# Patient Record
Sex: MALE | Race: BLACK OR AFRICAN AMERICAN | Employment: UNEMPLOYED | URBAN - METROPOLITAN AREA
[De-identification: names, ages, dates, MRNs, and addresses within clinical notes are randomized per-mention and may not be internally consistent; named-entity substitution may affect disease eponyms.]

---

## 2017-04-11 ENCOUNTER — OFFICE VISIT (OUTPATIENT)
Dept: FAMILY MEDICINE | Facility: CLINIC | Age: 47
End: 2017-04-11

## 2017-04-11 ENCOUNTER — TELEPHONE (OUTPATIENT)
Dept: FAMILY MEDICINE | Facility: CLINIC | Age: 47
End: 2017-04-11

## 2017-04-11 VITALS
BODY MASS INDEX: 39.12 KG/M2 | HEART RATE: 95 BPM | TEMPERATURE: 98.4 F | WEIGHT: 255.4 LBS | SYSTOLIC BLOOD PRESSURE: 122 MMHG | DIASTOLIC BLOOD PRESSURE: 84 MMHG

## 2017-04-11 DIAGNOSIS — Z11.3 SCREEN FOR STD (SEXUALLY TRANSMITTED DISEASE): ICD-10-CM

## 2017-04-11 DIAGNOSIS — R35.89 POLYURIA: Primary | ICD-10-CM

## 2017-04-11 DIAGNOSIS — I10 BENIGN ESSENTIAL HYPERTENSION: ICD-10-CM

## 2017-04-11 DIAGNOSIS — R10.9 CHRONIC ABDOMINAL PAIN: ICD-10-CM

## 2017-04-11 DIAGNOSIS — G89.29 CHRONIC ABDOMINAL PAIN: ICD-10-CM

## 2017-04-11 DIAGNOSIS — N48.1 BALANITIS: ICD-10-CM

## 2017-04-11 DIAGNOSIS — F33.0 MILD EPISODE OF RECURRENT MAJOR DEPRESSIVE DISORDER (H): ICD-10-CM

## 2017-04-11 DIAGNOSIS — F20.0 PARANOID SCHIZOPHRENIA (H): ICD-10-CM

## 2017-04-11 LAB
ANION GAP SERPL CALCULATED.3IONS-SCNC: 20 MMOL/L (ref 5–18)
BACTERIA: NORMAL
BILIRUBIN UR: ABNORMAL
BLOOD UR: ABNORMAL
BUN SERPL-MCNC: 9 MG/DL (ref 8–22)
CALCIUM SERPL-MCNC: 9.4 MG/DL (ref 8.5–10.5)
CASTS: NORMAL /LPF
CHLORIDE SERPL-SCNC: 94 MMOL/L (ref 98–107)
CHOLEST SERPL-MCNC: 267.8 MG/DL (ref 0–200)
CHOLEST/HDLC SERPL: 6.7 {RATIO} (ref 0–5)
CO2 SERPL-SCNC: 12 MMOL/L (ref 22–31)
CREAT SERPL-MCNC: 1.26 MG/DL (ref 0.7–1.3)
CREAT UR-MCNC: 31.1 MG/DL
CRYSTAL URINE: NORMAL /LPF
EPITHELIAL CELLS UR: <2 /LPF (ref 0–2)
GLUCOSE SERPL-MCNC: 575 MG/DL (ref 70–125)
GLUCOSE URINE: ABNORMAL
HDLC SERPL-MCNC: 40.2 MG/DL
HIV 1+2 AB+HIV1 P24 AG SERPL QL IA: NEGATIVE
KETONES UR QL: ABNORMAL
LDLC SERPL CALC-MCNC: 155 MG/DL (ref 0–129)
LEUKOCYTE ESTERASE UR: NEGATIVE
MICROALBUMIN UR-MCNC: 2.25 MG/DL (ref 0–1.99)
MICROALBUMIN/CREAT UR: 72.3 MG/G
MUCOUS URINE: NORMAL LPF
NITRITE UR QL STRIP: NEGATIVE
PH UR STRIP: 5 [PH] (ref 5–7)
POTASSIUM SERPL-SCNC: 4.2 MMOL/L (ref 3.5–5)
PROTEIN UR: NEGATIVE
RBC URINE: NORMAL /HPF
SODIUM SERPL-SCNC: 126 MMOL/L (ref 136–145)
SP GR UR STRIP: 1.01
TRIGL SERPL-MCNC: 360.7 MG/DL (ref 0–150)
UROBILINOGEN UR STRIP-ACNC: ABNORMAL
VLDL CHOLESTEROL: 72.1 MG/DL (ref 7–32)
WBC URINE: <2 /HPF

## 2017-04-11 RX ORDER — TRAZODONE HYDROCHLORIDE 100 MG/1
200 TABLET ORAL AT BEDTIME
Qty: 90 TABLET | Refills: 0 | Status: SHIPPED | OUTPATIENT
Start: 2017-04-11 | End: 2018-10-09

## 2017-04-11 RX ORDER — LISINOPRIL 40 MG/1
40 TABLET ORAL DAILY
Qty: 30 TABLET | Refills: 12 | Status: CANCELLED | OUTPATIENT
Start: 2017-04-11

## 2017-04-11 RX ORDER — LISINOPRIL 20 MG/1
20 TABLET ORAL DAILY
Qty: 30 TABLET | Refills: 11 | Status: SHIPPED | OUTPATIENT
Start: 2017-04-11 | End: 2018-10-09

## 2017-04-11 RX ORDER — METOPROLOL SUCCINATE 100 MG/1
100 TABLET, EXTENDED RELEASE ORAL DAILY
Qty: 30 TABLET | Refills: 12 | Status: CANCELLED | OUTPATIENT
Start: 2017-04-11

## 2017-04-11 RX ORDER — TRAMADOL HYDROCHLORIDE 50 MG/1
50-100 TABLET ORAL EVERY 6 HOURS PRN
Qty: 30 TABLET | Refills: 0 | Status: SHIPPED | OUTPATIENT
Start: 2017-04-11 | End: 2018-10-09

## 2017-04-11 RX ORDER — ATORVASTATIN CALCIUM 40 MG/1
40 TABLET, FILM COATED ORAL DAILY
Qty: 30 TABLET | Refills: 1 | Status: SHIPPED | OUTPATIENT
Start: 2017-04-11 | End: 2018-10-09

## 2017-04-11 RX ORDER — CLOTRIMAZOLE 1 %
CREAM (GRAM) TOPICAL 2 TIMES DAILY
Qty: 15 G | Refills: 1 | Status: SHIPPED | OUTPATIENT
Start: 2017-04-11 | End: 2018-10-09

## 2017-04-11 NOTE — MR AVS SNAPSHOT
After Visit Summary   2017    David Juarez    MRN: [id number]           Patient Information     Date Of Birth          1970        Visit Information        Provider Department      2017 1:10 PM Peyman James MD [facility]        Today's Diagnoses     Polyuria    -  1    Screen for STD (sexually transmitted disease)        Benign essential hypertension        Mild episode of recurrent major depressive disorder (H)        Paranoid schizophrenia (H)        Chronic abdominal pain        Balanitis           Follow-ups after your visit        Follow-up notes from your care team     Return in about 1 week (around 2017).      Who to contact     Please call your clinic at [phone] to:    Ask questions about your health    Make or cancel appointments    Discuss your medicines    Learn about your test results    Speak to your doctor   If you have compliments or concerns about an experience at your clinic, or if you wish to file a complaint, please contact [facility] Patient Relations at [phone] or email us at [email or web address]         Additional Information About Your Visit        MyChart Information     Enefgy is an electronic gateway that provides easy, online access to your medical records. With Enefgy, you can request a clinic appointment, read your test results, renew a prescription or communicate with your care team.     To sign up for Enefgy visit the website at [email or web address]   You will be asked to enter the access code listed below, as well as some personal information. Please follow the directions to create your username and password.     Your access code is: [email or web address]  Expires: 7/10/2017  4:46 PM     Your access code will  in 90 days. If you need help or a new code, please contact your [facility] or call [phone] for assistance.        Care EveryWhere ID      This is your Care EveryWhere ID. This could be used by other organizations to access your [place] medical records  [id number]        Your Vitals Were     Pulse Temperature BMI (Body Mass Index)             95 98.4  F (36.9  C) (Oral) 39.12 kg/m2          Blood Pressure from Last 3 Encounters:   04/11/17 122/84   10/14/14 (!) 154/104   09/11/14 (!) 155/111    Weight from Last 3 Encounters:   04/11/17 255 lb 6.4 oz (115.8 kg)   10/14/14 273 lb (123.8 kg)   09/11/14 279 lb 8 oz (126.8 kg)              We Performed the Following     Basic Metabolic Profile ([facility])     Chlamydia/Gono Amplified ([facility])     Hepatitis C Antibody ([facility])     HIV Ag/Ab Screen [place] ([facility])     Lipid Panel ([facility])     Microalbumin Random Ur ([facility])     Syphilis Screen [place] (RPR/VDRL) ([facility])     Urinalysis, Micro If ([facility])     Urine Microscopic ([facility])          Today's Medication Changes          These changes are accurate as of: 4/11/17  4:46 PM.  If you have any questions, ask your nurse or doctor.               Start taking these medicines.        Dose/Directions    clotrimazole 1 % cream   Commonly known as:  LOTRIMIN   Used for:  Balanitis   Started by:  Peyman James MD        Apply topically 2 times daily   Quantity:  15 g   Refills:  1         These medicines have changed or have updated prescriptions.        Dose/Directions    lisinopril 20 MG tablet   Commonly known as:  PRINIVIL/ZESTRIL   This may have changed:    - medication strength  - how much to take   Used for:  Benign essential hypertension   Changed by:  Peyman James MD        Dose:  20 mg   Take 1 tablet (20 mg) by mouth daily   Quantity:  30 tablet   Refills:  11       traMADol 50 MG tablet   Commonly known as:  ULTRAM   This may have changed:  additional instructions   Used for:  Chronic abdominal pain   Changed by:  Peyman James MD        Dose:   mg   Take 1-2 tablets ( mg) by mouth every 6 hours as  needed for pain maximum 6 tablet(s) per day   Quantity:  30 tablet   Refills:  0         Stop taking these medicines if you haven't already. Please contact your care team if you have questions.     ARIPiprazole 30 MG tablet   Commonly known as:  ABILIFY   Stopped by:  Peyman James MD           busPIRone 10 MG tablet   Commonly known as:  BUSPAR   Stopped by:  Peyman James MD           chlorthalidone 25 MG tablet   Commonly known as:  HYGROTON   Stopped by:  Peyman James MD           escitalopram 20 MG tablet   Commonly known as:  LEXAPRO   Stopped by:  Peyman James MD           gabapentin 800 MG tablet   Commonly known as:  NEURONTIN   Stopped by:  Peyman James MD           metoprolol 100 MG 24 hr tablet   Commonly known as:  TOPROL-XL   Stopped by:  Peyman James MD           mirtazapine 45 MG tablet   Commonly known as:  REMERON   Stopped by:  Peyman James MD           MULTIVITAL Tabs   Stopped by:  Peyman James MD           psyllium 0.52 G capsule   Commonly known as:  METAMUCIL   Stopped by:  Peyman James MD           risperiDONE 2 MG tablet   Commonly known as:  risperDAL   Stopped by:  Peyman James MD                Where to get your medicines      These medications were sent to [facility] - [place], [place] - [address], [place] [address]     Phone:  [phone]     atorvastatin 40 MG tablet    clotrimazole 1 % cream    lisinopril 20 MG tablet    traZODone 100 MG tablet         Some of these will need a paper prescription and others can be bought over the counter.  Ask your nurse if you have questions.     Bring a paper prescription for each of these medications     traMADol 50 MG tablet                Primary Care Provider Office Phone # Fax #    Peyman James [phone]       [address] [address]        Thank you!     Thank you for choosing [facility]  for your care. Our  goal is always to provide you with excellent care. Hearing back from our patients is one way we can continue to improve our services. Please take a few minutes to complete the written survey that you may receive in the mail after your visit with us. Thank you!             Your Updated Medication List - Protect others around you: Learn how to safely use, store and throw away your medicines at www.disposemymeds.org.          This list is accurate as of: 4/11/17  4:46 PM.  Always use your most recent med list.                   Brand Name Dispense Instructions for use    atorvastatin 40 MG tablet    LIPITOR    30 tablet    Take 1 tablet (40 mg) by mouth daily       clotrimazole 1 % cream    LOTRIMIN    15 g    Apply topically 2 times daily       lisinopril 20 MG tablet    PRINIVIL/ZESTRIL    30 tablet    Take 1 tablet (20 mg) by mouth daily       omeprazole 40 MG capsule    priLOSEC     Reported on 4/11/2017       traMADol 50 MG tablet    ULTRAM    30 tablet    Take 1-2 tablets ( mg) by mouth every 6 hours as needed for pain maximum 6 tablet(s) per day       traZODone 100 MG tablet    DESYREL    90 tablet    Take 2 tablets (200 mg) by mouth At Bedtime

## 2017-04-11 NOTE — LETTER
RETURN TO WORK/SCHOOL FORM    4/11/2017    Re: David Juarez  1970      To Whom It May Concern:     David Juarez was seen in clinic today.    Restrictions:  None      Peyman James MD  4/11/2017 1:43 PM
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
## 2017-04-12 ENCOUNTER — TRANSFERRED RECORDS (OUTPATIENT)
Dept: HEALTH INFORMATION MANAGEMENT | Facility: CLINIC | Age: 47
End: 2017-04-12

## 2017-04-12 ENCOUNTER — TELEPHONE (OUTPATIENT)
Dept: FAMILY MEDICINE | Facility: CLINIC | Age: 47
End: 2017-04-12

## 2017-04-12 ENCOUNTER — OFFICE VISIT (OUTPATIENT)
Dept: FAMILY MEDICINE | Facility: CLINIC | Age: 47
End: 2017-04-12

## 2017-04-12 ENCOUNTER — DOCUMENTATION ONLY (OUTPATIENT)
Dept: FAMILY MEDICINE | Facility: CLINIC | Age: 47
End: 2017-04-12

## 2017-04-12 VITALS
TEMPERATURE: 97.8 F | DIASTOLIC BLOOD PRESSURE: 86 MMHG | BODY MASS INDEX: 38.08 KG/M2 | HEART RATE: 109 BPM | SYSTOLIC BLOOD PRESSURE: 140 MMHG | WEIGHT: 248.6 LBS

## 2017-04-12 DIAGNOSIS — E11.10 TYPE 2 DIABETES MELLITUS WITH KETOACIDOSIS WITHOUT COMA, WITHOUT LONG-TERM CURRENT USE OF INSULIN (H): Primary | ICD-10-CM

## 2017-04-12 LAB
C TRACH RRNA SPEC QL NAA+PROBE: NEGATIVE
HCV AB SER QL: NEGATIVE
N GONORRHOEA RRNA SPEC QL NAA+PROBE: NEGATIVE
RPR SER QL: NORMAL

## 2017-04-12 ASSESSMENT — PATIENT HEALTH QUESTIONNAIRE - PHQ9: SUM OF ALL RESPONSES TO PHQ QUESTIONS 1-9: 11

## 2017-04-12 NOTE — TELEPHONE ENCOUNTER
See Dr. Reyes 4/12 note.   Pt states he will come tomorrow morning.   Reminded pt of the recommendations/advice from Dr. Charles. /ANA MARIA Whitfield

## 2017-04-12 NOTE — PROGRESS NOTES
Preceptor attestation:  Patient seen and discussed with the resident. Assessment and plan reviewed with resident and agreed upon.  Supervising physician: Trevon Arguelles  [facility]

## 2017-04-12 NOTE — PROGRESS NOTES
There are no exam notes on file for this visit.  Chief Complaint   Patient presents with     RECHECK     Pt. states that he got a phone call last night from our clinic stating that he should come in right away to go over lab results      Headache     Headache and alot of dizziness      Cough     Coughing, greenish and black discharge      Blood pressure 140/86, pulse 109, temperature 97.8  F (36.6  C), temperature source Oral, weight 248 lb 9.6 oz (112.8 kg).    Assessment and Plan   Continued dictation on David Juarez:     ASSESSMENT AND PLAN:     1.  New onset diabetes, presumed to be type II:  I talked extensively with patient about the diagnosis and the lab and urine results from yesterday.  Given the lab findings and today's worsening SOB, fatigue, dizziness, body aches and vomiting, I told him that I recommend that he go to the Emergency Department for further evaluation.  Patient was upset for having to wait, so he wasn't happy with this news as well.  I talked extensively about the risks of not presenting to the ER, which could include worsening SOB, abdominal pain, vomiting, altered mental status, and also death if he weren't evaluated and soon started on fluids and insulin.  The patient reiterated and verbalized understanding, but again he was still very frustrated and agitated with having to wait today for the visit, so he proceeded to leave and said he didn't want to present to the ER. He was fully decisional during our discussion  I'll attempt to contact the patient this afternoon.  We'll also talk to patient's PCP, as he has a good relationship with him.             Options for treatment and follow-up care were reviewed with the patient and/or guardian. David Juarez and/or guardian engaged in the decision making process and verbalized understanding of the options discussed and agreed with the final plan.  Patient discussed with Dr. Arguelles.   Jimbo Edwards DO         HPI        David Juarez is a 46 year old  male SUBJECTIVE:  David Juarez is a 46-year-old-male w/past medical history of major depressive disorder, hyperlipidemia, hypertension, paranoid schizophrenia, alcohol abuse, and new onset diabetes.    Patient was following up today for lab results from yesterday's visit.  He was seen in clinic and he talked about URI symptoms and also a month of polyuria and polydipsia.  He was noted to have decreased sodium, decreased total CO2, an elevated random glucose, and glucose and ketones in the urine.  Anion gap was also noted.     Patient endorsed one week of cough w/feeling of stuff getting stuck in his chest or phlegm getting stuck in his chest.  He had a little bit of runny nose as well.  He also endorsed today worsening fatigue, dizziness, and SOB.  He was also nauseous and he threw up one time so far today.  He denied recent headaches, fevers, or diarrhea.             Patient Active Problem List   Diagnosis     Health Care Home     Alcohol abuse     MDD (major depressive disorder)     Hyperlipidemia     Encounter for counseling     Benign essential hypertension     Paranoid schizophrenia (H)     Current Outpatient Prescriptions   Medication Sig Dispense Refill     atorvastatin (LIPITOR) 40 MG tablet Take 1 tablet (40 mg) by mouth daily 30 tablet 1     traZODone (DESYREL) 100 MG tablet Take 2 tablets (200 mg) by mouth At Bedtime 90 tablet 0     lisinopril (PRINIVIL/ZESTRIL) 20 MG tablet Take 1 tablet (20 mg) by mouth daily 30 tablet 11     traMADol (ULTRAM) 50 MG tablet Take 1-2 tablets ( mg) by mouth every 6 hours as needed for pain maximum 6 tablet(s) per day 30 tablet 0     clotrimazole (LOTRIMIN) 1 % cream Apply topically 2 times daily 15 g 1     omeprazole (PRILOSEC) 40 MG capsule Reported on 4/11/2017       Allergies   Allergen Reactions     Ibuprofen Sodium      Family History   Problem Relation Age of Onset     DIABETES No family hx of      Coronary Artery  Disease No family hx of      Other Cancer No family hx of      Results for orders placed or performed in visit on 04/11/17 (from the past 24 hour(s))   Hepatitis C Antibody ([facility])   Result Value Ref Range    Hepatitis C Antibody Screen Negative Negative    Narrative    Test performed by:  [facility] [address] [place] [address]   Syphilis Screen [place] (RPR/VDRL) ([facility])   Result Value Ref Range    Syphilis Screen [place] Non-Reactive Non-Reactive    Narrative    Test performed by:  [facility] [address] [place] [address]   HIV Ag/Ab Screen [place] ([facility])   Result Value Ref Range    HIV Antigen/Antibody Negative Negative    Narrative    Test performed by:  [facility] [address] [place] [address]   Lipid Panel ([facility])   Result Value Ref Range    Cholesterol 267.8 (H) 0.0 - 200.0 mg/dL    Cholesterol/HDL Ratio 6.7 (H) 0.0 - 5.0    HDL Cholesterol 40.2 >40.0 mg/dL    LDL Cholesterol Calculated 155 (H) 0 - 129 mg/dL    Triglycerides 360.7 (H) 0.0 - 150.0 mg/dL    VLDL Cholesterol 72.1 (H) 7.0 - 32.0 mg/dL   Urinalysis, Micro If ([facility])   Result Value Ref Range    Specific Gravity Urine 1.010 1.005 - 1.030    pH Urine 5.0 4.5 - 8.0    Leukocyte Esterase UR Negative NEGATIVE    Nitrite Urine Negative NEGATIVE    Protein UR Negative NEGATIVE    Glucose Urine 2+ (A) NEGATIVE    Ketones Urine 4+ (A) NEGATIVE    Urobilinogen mg/dL 0.2 E.U./dL 0.2 E.U./dL    Bilirubin UR 1+ (A) NEGATIVE    Blood UR Trace-lysed (A) NEGATIVE   Chlamydia/Gono Amplified ([facility])   Result Value Ref Range    Chlamydia trac,Amplified Prb Negative Negative    N gonorrhoeae,Amplified Prb Negative Negative    Narrative    Test performed by:  [facility] [address] [place] [address]   Microalbumin Random Ur ([facility])   Result Value Ref Range    Microalbumin, Urine 2.25 (H) 0.00 - 1.99 mg/dL    Creatinine, Urine 31.1 mg/dL    Albumin  Urine mg/g Cr 72.3 (H) <=19.9 mg/g    Narrative    Test performed by:  [facility] [address] [place] [address]  Microalbumin, Random Urine  <2.0 mg/dL . . . . . . . . Normal  3.0-30.0 mg/dL . . . . . . Microalbuminuria  >30.0 mg/dL . . . . . .  . Clinical Proteinuria  Microalbumin/Creatinine Ratio, Random Urine  <20 mg/g . . . . .. . . . Normal   mg/g . . . . . . . Microalbuminuria  >300 mg/g . . . . . . . . Clinical Proteinuria   Urine Microscopic ([facility])   Result Value Ref Range    WBC Urine <2 <5 /hpf    RBC Urine None <5 /hpf    Epithelial Cells UR <2 0 - 2 /lpf    Mucous Urine None NONE lpf    Casts Urine None NONE /lpf    Crystal Urine None NONE /lpf    Bacteria Wet Prep Few None   Basic Metabolic Profile ([facility])   Result Value Ref Range    Sodium 126 (L) 136 - 145 mmol/L    Potassium 4.2 3.5 - 5.0 mmol/L    Chloride 94 (L) 98 - 107 mmol/L    CO2, Total 12 (L) 22 - 31 mmol/L    Anion Gap 20 (H) 5 - 18 mmol/L    Glucose 575 (HH) 70 - 125 mg/dL    Calcium 9.4 8.5 - 10.5 mg/dL    Urea Nitrogen 9 8 - 22 mg/dL    Creatinine 1.26 0.70 - 1.30 mg/dL    GFR Estimate If Black >60 >60 mL/min/1.73m2    GFR Estimate >60 >60 mL/min/1.73m2    Narrative    Test performed by:  [facility] [address] [place] [address]  Fasting Glucose reference range is 70-99 mg/dL per  American Diabetes Association (ADA) guidelines.            Review of Systems:   As Above             Physical Exam:     Vitals:    04/12/17 1051   BP: 140/86   Pulse: 109   Temp: 97.8  F (36.6  C)   TempSrc: Oral   Weight: 248 lb 9.6 oz (112.8 kg)     Body mass index is 38.08 kg/(m^2).    GENERAL:: healthy, alert and mildly tachypnic at rest, agitated  EARS: No erythema around TMs bialterally  Throat: No significant tonsillar swelling or exudates noted, no anterior or posterior cervical lymphadenopathy  Heart: slightly tachycardic, regular rhythm and no murmurs  LUNGS: no rib retractions, clear throughout,  no wheezing  Abdomen: BS active, no tenderness to palpation    Office Visit on 04/11/2017   Component Date Value Ref Range Status     Specific Gravity Urine 04/11/2017 1.010  1.005 - 1.030 Final     pH Urine 04/11/2017 5.0  4.5 - 8.0 Final     Leukocyte Esterase UR 04/11/2017 Negative  NEGATIVE Final     Nitrite Urine 04/11/2017 Negative  NEGATIVE Final     Protein UR 04/11/2017 Negative  NEGATIVE Final     Glucose Urine 04/11/2017 2+* NEGATIVE Final     Ketones Urine 04/11/2017 4+* NEGATIVE Final     Urobilinogen mg/dL 04/11/2017 0.2 E.U./dL  0.2 E.U./dL Final     Bilirubin UR 04/11/2017 1+* NEGATIVE Final     Blood UR 04/11/2017 Trace-lysed* NEGATIVE Final     Chlamydia trac,Amplified Prb 04/11/2017 Negative  Negative Final     N gonorrhoeae,Amplified Prb 04/11/2017 Negative  Negative Final     Hepatitis C Antibody Screen 04/11/2017 Negative  Negative Final     Syphilis Screen [place] 04/11/2017 Non-Reactive  Non-Reactive Final     HIV Antigen/Antibody 04/11/2017 Negative  Negative Final     Cholesterol 04/11/2017 267.8* 0.0 - 200.0 mg/dL Final     Cholesterol/HDL Ratio 04/11/2017 6.7* 0.0 - 5.0 Final     HDL Cholesterol 04/11/2017 40.2  >40.0 mg/dL Final     LDL Cholesterol Calculated 04/11/2017 155* 0 - 129 mg/dL Final     Triglycerides 04/11/2017 360.7* 0.0 - 150.0 mg/dL Final     VLDL Cholesterol 04/11/2017 72.1* 7.0 - 32.0 mg/dL Final     Microalbumin, Urine 04/11/2017 2.25* 0.00 - 1.99 mg/dL Final     Creatinine, Urine 04/11/2017 31.1  mg/dL Final     Albumin Urine mg/g Cr 04/11/2017 72.3* <=19.9 mg/g Final     WBC Urine 04/11/2017 <2  <5 /hpf Final     RBC Urine 04/11/2017 None  <5 /hpf Final     Epithelial Cells UR 04/11/2017 <2  0 - 2 /lpf Final     Mucous Urine 04/11/2017 None  NONE lpf Final     Casts Urine 04/11/2017 None  NONE /lpf Final     Crystal Urine 04/11/2017 None  NONE /lpf Final     Bacteria Wet Prep 04/11/2017 Few  None Final     Sodium 04/11/2017 126* 136 - 145 mmol/L Final     Potassium  04/11/2017 4.2  3.5 - 5.0 mmol/L Final     Chloride 04/11/2017 94* 98 - 107 mmol/L Final     CO2, Total 04/11/2017 12* 22 - 31 mmol/L Final     Anion Gap 04/11/2017 20* 5 - 18 mmol/L Final     Glucose 04/11/2017 575* 70 - 125 mg/dL Final     Calcium 04/11/2017 9.4  8.5 - 10.5 mg/dL Final     Urea Nitrogen 04/11/2017 9  8 - 22 mg/dL Final     Creatinine 04/11/2017 1.26  0.70 - 1.30 mg/dL Final     GFR Estimate If Black 04/11/2017 >60  >60 mL/min/1.73m2 Final     GFR Estimate 04/11/2017 >60  >60 mL/min/1.73m2 Final

## 2017-04-12 NOTE — PROGRESS NOTES
"Spoke with patient over the phone at 9:30 AM. He tells me he is feeling well and is having no lightheadedness, dizziness, nausea or vomiting. He wonders if his glucose could have been high yesterday in clinic because he ate \"a bunch of candy\" before his labs. I told him that this is possible, but given his other abnormal labs I anticipate that his glucose has been sitting at a high value for a while. He says he doesn't check his sugars and ran out of supplies for this. He also says he is not taking his metformin because it makes his \"sugars go too low\". He says he can't come into clinic today because he has a lot of other things to get done. He says he absolutely cannot make it into clinic today and can't move around any of his plans. He does agree to call clinic this morning and schedule an appointment early tomorrow morning. I did discuss that high sugars can lead to coma and death and he assumes these risks. If he develops symptoms, he will call us or go to the ED for evaluation.     Will CC to  and nursing for FYI.    Lilibeth Charles, PGY 2  Family Medicine Resident  [facility]       "

## 2017-04-12 NOTE — TELEPHONE ENCOUNTER
----- Message from Veronique Centeno MD sent at 4/12/2017  9:29 AM CDT -----  Regarding: Follow up Labs  Lopez Ginnarosa and Petra!    Could you help follow up with this patient today and reach out to this patient to have him schedule an appointment in clinic today to follow up on his diabetes labs?     We received sign out from Dr. Ortiz, the on call resident, who talked to him last night about scheduling a follow up appointment and he said he would call clinic to do so or go to the ED if his symptoms worsened. However, we called him again this morning and are unable to reach him.    Let me know if you have any questions.   Thanks!  Veronique

## 2017-04-12 NOTE — MR AVS SNAPSHOT
After Visit Summary   2017    David Juarez    MRN: [id number]           Patient Information     Date Of Birth          1970        Visit Information        Provider Department      2017 11:00 AM Jimbo Edwards,  [facility]        Today's Diagnoses     Type 2 diabetes mellitus with ketoacidosis without coma, without long-term current use of insulin (H)    -  1       Follow-ups after your visit        Who to contact     Please call your clinic at [phone] to:    Ask questions about your health    Make or cancel appointments    Discuss your medicines    Learn about your test results    Speak to your doctor   If you have compliments or concerns about an experience at your clinic, or if you wish to file a complaint, please contact [facility] Patient Relations at [phone] or email us at [email or web address]         Additional Information About Your Visit        MyChart Information     [employer] is an electronic gateway that provides easy, online access to your medical records. With [employer], you can request a clinic appointment, read your test results, renew a prescription or communicate with your care team.     To sign up for [email or web address] visit the website at [email or web address]   You will be asked to enter the access code listed below, as well as some personal information. Please follow the directions to create your username and password.     Your access code is: [email or web address]  Expires: 7/10/2017  4:46 PM     Your access code will  in 90 days. If you need help or a new code, please contact your [facility] or call [phone] for assistance.        Care EveryWhere ID     This is your Care EveryWhere ID. This could be used by other organizations to access your [place] medical records  [id number]        Your Vitals Were     Pulse Temperature BMI (Body Mass Index)             109 97.8  F  (36.6  C) (Oral) 38.08 kg/m2          Blood Pressure from Last 3 Encounters:   04/12/17 140/86   04/11/17 122/84   10/14/14 (!) 154/104    Weight from Last 3 Encounters:   04/12/17 248 lb 9.6 oz (112.8 kg)   04/11/17 255 lb 6.4 oz (115.8 kg)   10/14/14 273 lb (123.8 kg)              Today, you had the following     No orders found for display       Primary Care Provider Office Phone # Fax #    Peyman James [phone]       [address] [address]        Thank you!     Thank you for choosing [facility]  for your care. Our goal is always to provide you with excellent care. Hearing back from our patients is one way we can continue to improve our services. Please take a few minutes to complete the written survey that you may receive in the mail after your visit with us. Thank you!             Your Updated Medication List - Protect others around you: Learn how to safely use, store and throw away your medicines at www.disposemymeds.org.          This list is accurate as of: 4/12/17 11:59 PM.  Always use your most recent med list.                   Brand Name Dispense Instructions for use    atorvastatin 40 MG tablet    LIPITOR    30 tablet    Take 1 tablet (40 mg) by mouth daily       clotrimazole 1 % cream    LOTRIMIN    15 g    Apply topically 2 times daily       lisinopril 20 MG tablet    PRINIVIL/ZESTRIL    30 tablet    Take 1 tablet (20 mg) by mouth daily       omeprazole 40 MG capsule    priLOSEC     Reported on 4/11/2017       traMADol 50 MG tablet    ULTRAM    30 tablet    Take 1-2 tablets ( mg) by mouth every 6 hours as needed for pain maximum 6 tablet(s) per day       traZODone 100 MG tablet    DESYREL    90 tablet    Take 2 tablets (200 mg) by mouth At Bedtime

## 2017-04-12 NOTE — TELEPHONE ENCOUNTER
Received a call from the [facility] lab regarding abnormal BMP results.  Patient was seen in clinic today for the first time in 2-1/2 years.  He has been having polyuria and polydipsia and was concerned about diabetes.  He was found to have a blood glucose level of 575.  His was also found to have an anion gap metabolic acidosis and ketones in his urine.    I called patient to discuss the results with him.  He continues to endorse polyuria and polydipsia but otherwise states that he feels fine.  He denies any lightheadedness or altered mental status.  I discussed the importance of him returning to clinic tomorrow morning to be reevaluated and started on a diabetic regimen.  I also discussed warning symptoms that should prompt a visit to the emergency department tonight including but not limited to lethargy, change in mentation, or rapid breathing.    Patient expressed understanding and agreement with this plan.  He will call the clinic in the morning to set up a same day appointment.    Patient discussed with Dr. Tee, attending physician, who is in agreement with the plan. Encounter routed to Dr. James.    Jim Ortiz DO PGY-2      Results for orders placed or performed in visit on 04/11/17   Urinalysis, Micro If ([facility])   Result Value Ref Range    Specific Gravity Urine 1.010 1.005 - 1.030    pH Urine 5.0 4.5 - 8.0    Leukocyte Esterase UR Negative NEGATIVE    Nitrite Urine Negative NEGATIVE    Protein UR Negative NEGATIVE    Glucose Urine 2+ (A) NEGATIVE    Ketones Urine 4+ (A) NEGATIVE    Urobilinogen mg/dL 0.2 E.U./dL 0.2 E.U./dL    Bilirubin UR 1+ (A) NEGATIVE    Blood UR Trace-lysed (A) NEGATIVE   Lipid Panel ([place])   Result Value Ref Range    Cholesterol 267.8 (H) 0.0 - 200.0 mg/dL    Cholesterol/HDL Ratio 6.7 (H) 0.0 - 5.0    HDL Cholesterol 40.2 >40.0 mg/dL    LDL Cholesterol Calculated 155 (H) 0 - 129 mg/dL    Triglycerides 360.7 (H) 0.0 - 150.0 mg/dL    VLDL Cholesterol 72.1 (H) 7.0 - 32.0  mg/dL   Urine Microscopic ([facility])   Result Value Ref Range    WBC Urine <2 <5 /hpf    RBC Urine None <5 /hpf    Epithelial Cells UR <2 0 - 2 /lpf    Mucous Urine None NONE lpf    Casts Urine None NONE /lpf    Crystal Urine None NONE /lpf    Bacteria Wet Prep Few None   Basic Metabolic Profile ([facility])   Result Value Ref Range    Sodium 126 (L) 136 - 145 mmol/L    Potassium 4.2 3.5 - 5.0 mmol/L    Chloride 94 (L) 98 - 107 mmol/L    CO2, Total 12 (L) 22 - 31 mmol/L    Anion Gap 20 (H) 5 - 18 mmol/L    Glucose 575 (HH) 70 - 125 mg/dL    Calcium 9.4 8.5 - 10.5 mg/dL    Urea Nitrogen 9 8 - 22 mg/dL    Creatinine 1.26 0.70 - 1.30 mg/dL    GFR Estimate If Black >60 >60 mL/min/1.73m2    GFR Estimate >60 >60 mL/min/1.73m2    Narrative    Test performed by:  [facility] [address] [place] [address]  Fasting Glucose reference range is 70-99 mg/dL per  American Diabetes Association (ADA) guidelines.

## 2017-04-14 ENCOUNTER — COMMUNICATION - HEALTHEAST (OUTPATIENT)
Dept: SCHEDULING | Facility: CLINIC | Age: 47
End: 2017-04-14

## 2017-04-17 NOTE — PROGRESS NOTES
SUBJECTIVE:  David Juarez is a 46-year-old male w/past medical history of hyperlipidemia, major depressive disorder, hypertension, and new onset diabetes, who presented today for f/u of lab results from yesterday.   Patient reported that he had a week of cough.  He said that it was nonproductive, but he couldn't get the phlegm up, and he felt that it was getting stuck in his throat.  Recently, he also was more SOB.  He was seen in clinic yesterday and he also reported about a month of polyuria and polydipsia.  UA and lab work were subsequently done that showed glucose and ketones in his urine and a metabolic panel with sodium of 126, CO2 of 12, anion gap of 20, and random glucose of 575.     I spoke with patient a little bit about diabetes and went over the lab work with him and answered all of his questions.  He said that the feeling of something getting stuck in his chest and SOB were the most troublesome symptoms.  He also endorsed a little bit of fatigue and dizziness today, but he denied lightheadedness.  He also endorsed nausea, and he threw up this morning.  No fevers, chills, or night sweats.

## 2017-04-23 ENCOUNTER — TRANSFERRED RECORDS (OUTPATIENT)
Dept: HEALTH INFORMATION MANAGEMENT | Facility: CLINIC | Age: 47
End: 2017-04-23

## 2017-04-24 ENCOUNTER — TRANSFERRED RECORDS (OUTPATIENT)
Dept: HEALTH INFORMATION MANAGEMENT | Facility: CLINIC | Age: 47
End: 2017-04-24

## 2017-04-25 ENCOUNTER — TRANSFERRED RECORDS (OUTPATIENT)
Dept: HEALTH INFORMATION MANAGEMENT | Facility: CLINIC | Age: 47
End: 2017-04-25

## 2017-04-26 ENCOUNTER — TELEPHONE (OUTPATIENT)
Dept: FAMILY MEDICINE | Facility: CLINIC | Age: 47
End: 2017-04-26

## 2017-07-24 ENCOUNTER — TRANSFERRED RECORDS (OUTPATIENT)
Dept: HEALTH INFORMATION MANAGEMENT | Facility: CLINIC | Age: 47
End: 2017-07-24

## 2017-07-25 ENCOUNTER — TRANSFERRED RECORDS (OUTPATIENT)
Dept: HEALTH INFORMATION MANAGEMENT | Facility: CLINIC | Age: 47
End: 2017-07-25

## 2017-08-03 ENCOUNTER — TELEPHONE (OUTPATIENT)
Dept: PSYCHOLOGY | Facility: CLINIC | Age: 47
End: 2017-08-03

## 2017-08-03 NOTE — TELEPHONE ENCOUNTER
"Placed outreach call to Mr. Juarez when he did not attend visit scheduled with Dr. James.  Visit was intended to be follow up from recent psychiatric hospitalization at [facility].  Unable to reach patient or leave message.  Outgoing message stated that he was \"unable to receive calls at this time.\"  Given chance of elevated risk in post-hospitalization period, reached out to his mother, Chanelle Juarez ([phone]) who is listed as his emergency contact in EPIC.  Unfortunately, this number was also out of service.  Able to find a different phone number for Mr. Juarez in [facility] ([phone]) and placed outreach call to this number.  This was apparently the phone of his friend, Rachna.  Did leave brief message that I was calling from [place] and that we were concerned when we did not see him today and would be very happy if he would give us a call to schedule follow up with Dr. James.  Rachna agreed to pass long this message when she next saw or talked to him.  "

## 2017-08-03 NOTE — TELEPHONE ENCOUNTER
Able to locate and print discharge plan of care from [facility] from 7/27/17.  Will have this scanned to file, but will include summary of plan below as well:    1.  Attend New Patient Group for [facility] outpatient mental health clinic on 8/14/17 from 1-3pm.  2.  Referred for mental health case management via [facility] ([phone])  3.  Referred for Rule 25, but patient declined this service during hospital stay.  4.  Was given contact info for [facility] for Adult Mental Health and Crisis numbers.

## 2017-08-05 ENCOUNTER — TRANSFERRED RECORDS (OUTPATIENT)
Dept: HEALTH INFORMATION MANAGEMENT | Facility: CLINIC | Age: 47
End: 2017-08-05

## 2017-12-19 ENCOUNTER — TRANSFERRED RECORDS (OUTPATIENT)
Dept: HEALTH INFORMATION MANAGEMENT | Facility: CLINIC | Age: 47
End: 2017-12-19

## 2017-12-20 ENCOUNTER — TRANSFERRED RECORDS (OUTPATIENT)
Dept: HEALTH INFORMATION MANAGEMENT | Facility: CLINIC | Age: 47
End: 2017-12-20

## 2018-01-09 ENCOUNTER — TRANSFERRED RECORDS (OUTPATIENT)
Dept: HEALTH INFORMATION MANAGEMENT | Facility: CLINIC | Age: 48
End: 2018-01-09

## 2018-01-16 ENCOUNTER — TRANSFERRED RECORDS (OUTPATIENT)
Dept: HEALTH INFORMATION MANAGEMENT | Facility: CLINIC | Age: 48
End: 2018-01-16

## 2018-01-17 ENCOUNTER — TRANSFERRED RECORDS (OUTPATIENT)
Dept: HEALTH INFORMATION MANAGEMENT | Facility: CLINIC | Age: 48
End: 2018-01-17

## 2018-03-07 ENCOUNTER — TRANSFERRED RECORDS (OUTPATIENT)
Dept: HEALTH INFORMATION MANAGEMENT | Facility: CLINIC | Age: 48
End: 2018-03-07

## 2018-04-29 ENCOUNTER — TRANSFERRED RECORDS (OUTPATIENT)
Dept: HEALTH INFORMATION MANAGEMENT | Facility: CLINIC | Age: 48
End: 2018-04-29

## 2018-05-03 ENCOUNTER — DOCUMENTATION ONLY (OUTPATIENT)
Dept: FAMILY MEDICINE | Facility: CLINIC | Age: 48
End: 2018-05-03
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
## 2018-05-07 NOTE — PROGRESS NOTES
I have reviewed and agree with the behavioral health fellow's documentation for this visit.  I did not personally see the patient.  Eendina Lagunas, PhD., LP

## 2018-06-26 ENCOUNTER — TRANSFERRED RECORDS (OUTPATIENT)
Dept: HEALTH INFORMATION MANAGEMENT | Facility: CLINIC | Age: 48
End: 2018-06-26

## 2018-10-09 ENCOUNTER — OFFICE VISIT (OUTPATIENT)
Dept: FAMILY MEDICINE | Facility: CLINIC | Age: 48
End: 2018-10-09
Payer: MEDICAID

## 2018-10-09 VITALS
OXYGEN SATURATION: 97 % | RESPIRATION RATE: 16 BRPM | HEART RATE: 64 BPM | DIASTOLIC BLOOD PRESSURE: 84 MMHG | SYSTOLIC BLOOD PRESSURE: 122 MMHG | WEIGHT: 239.6 LBS | BODY MASS INDEX: 36.7 KG/M2 | TEMPERATURE: 97.7 F

## 2018-10-09 DIAGNOSIS — F20.0 PARANOID SCHIZOPHRENIA (H): ICD-10-CM

## 2018-10-09 DIAGNOSIS — E11.9 TYPE 2 DIABETES MELLITUS WITHOUT COMPLICATION, WITHOUT LONG-TERM CURRENT USE OF INSULIN (H): ICD-10-CM

## 2018-10-09 DIAGNOSIS — I10 BENIGN ESSENTIAL HYPERTENSION: ICD-10-CM

## 2018-10-09 DIAGNOSIS — E78.5 HYPERLIPIDEMIA, UNSPECIFIED HYPERLIPIDEMIA TYPE: ICD-10-CM

## 2018-10-09 DIAGNOSIS — Z00.01 ENCOUNTER FOR ROUTINE ADULT MEDICAL EXAM WITH ABNORMAL FINDINGS: Primary | ICD-10-CM

## 2018-10-09 DIAGNOSIS — M62.830 BACK MUSCLE SPASM: ICD-10-CM

## 2018-10-09 LAB
BUN SERPL-MCNC: 11.7 MG/DL (ref 7–21)
CALCIUM SERPL-MCNC: 9.5 MG/DL (ref 8.5–10.1)
CHLORIDE SERPLBLD-SCNC: 96.6 MMOL/L (ref 98–110)
CHOLEST SERPL-MCNC: 230 MG/DL (ref 0–200)
CHOLEST/HDLC SERPL: 2.8 {RATIO} (ref 0–5)
CO2 SERPL-SCNC: 28.5 MMOL/L (ref 20–32)
CREAT SERPL-MCNC: 0.6 MG/DL (ref 0.7–1.3)
GFR SERPL CREATININE-BSD FRML MDRD: >90 ML/MIN/1.7 M2
GLUCOSE SERPL-MCNC: 102.5 MG'DL (ref 70–99)
HBA1C MFR BLD: 5.9 % (ref 4.1–5.7)
HDLC SERPL-MCNC: 83 MG/DL
HIV 1+2 AB+HIV1 P24 AG SERPL QL IA: NEGATIVE
LDLC SERPL CALC-MCNC: 134 MG/DL (ref 0–129)
POTASSIUM SERPL-SCNC: 4.2 MMOL/DL (ref 3.2–4.6)
SODIUM SERPL-SCNC: 130.8 MMOL/L (ref 132–142)
TRIGL SERPL-MCNC: 64.6 MG/DL (ref 0–150)
VLDL CHOLESTEROL: 12.9 MG/DL (ref 7–32)

## 2018-10-09 RX ORDER — LISINOPRIL 10 MG/1
10 TABLET ORAL DAILY
Qty: 90 TABLET | Refills: 3 | Status: SHIPPED | OUTPATIENT
Start: 2018-10-09 | End: 2019-07-12

## 2018-10-09 RX ORDER — RISPERIDONE 2 MG/1
2 TABLET ORAL AT BEDTIME
Qty: 30 TABLET | Refills: 1 | COMMUNITY
Start: 2018-10-09 | End: 2019-07-12

## 2018-10-09 RX ORDER — TRAZODONE HYDROCHLORIDE 100 MG/1
100 TABLET ORAL
Qty: 90 TABLET | Refills: 3 | Status: SHIPPED | OUTPATIENT
Start: 2018-10-09 | End: 2019-07-12

## 2018-10-09 RX ORDER — CYCLOBENZAPRINE HCL 10 MG
10 TABLET ORAL 3 TIMES DAILY PRN
Qty: 30 TABLET | Refills: 0 | Status: SHIPPED | OUTPATIENT
Start: 2018-10-09 | End: 2018-11-09

## 2018-10-09 RX ORDER — DULOXETIN HYDROCHLORIDE 60 MG/1
60 CAPSULE, DELAYED RELEASE ORAL DAILY
Qty: 90 CAPSULE | Refills: 3 | Status: SHIPPED | OUTPATIENT
Start: 2018-10-09 | End: 2019-07-12

## 2018-10-09 RX ORDER — METOPROLOL TARTRATE 25 MG/1
25 TABLET, FILM COATED ORAL 2 TIMES DAILY
Qty: 180 TABLET | Refills: 3 | Status: SHIPPED | OUTPATIENT
Start: 2018-10-09 | End: 2019-07-12

## 2018-10-09 NOTE — PATIENT INSTRUCTIONS
"  Preventive Health Recommendations  Male Ages 40 to 49    Yearly exam:             See your health care provider every year in order to  o   Review health changes.   o   Discuss preventive care.    o   Review your medicines if your doctor has prescribed any.    You should be tested each year for STDs (sexually transmitted diseases) if you re at risk.     Have a cholesterol test every 5 years.     Have a colonoscopy (test for colon cancer) if someone in your family has had colon cancer or polyps before age 50.     After age 45, have a diabetes test (fasting glucose). If you are at risk for diabetes, you should have this test every 3 years.      Talk with your health care provider about whether or not a prostate cancer screening test (PSA) is right for you.    Shots: Get a flu shot each year. Get a tetanus shot every 10 years.     Nutrition:    Eat at least 5 servings of fruits and vegetables daily.     Eat whole-grain bread, whole-wheat pasta and brown rice instead of white grains and rice.     Get adequate Calcium and Vitamin D.     Lifestyle    Exercise for at least 150 minutes a week (30 minutes a day, 5 days a week). This will help you control your weight and prevent disease.     Limit alcohol to one drink per day.     No smoking.     Wear sunscreen to prevent skin cancer.     See your dentist every six months for an exam and cleaning.   MENTAL HEALTH REFERRAL  October 9, 2018 at 10:40 am Referral forwarded to CHARAN Rodas who will process with Behavioral Health and assist with scheduling. [facility]    OPHTHALMOLOGY ADULT REFERRAL  October 9, 2018 at 10:41 am called David - \"the wireless customer you are calling is not available. Please try again later.\" [facility]  October 10, 2018 at 2:27 pm called David - \"the wireless customer you are calling is not available. Please try again later.\" [facility]  October 11, 2018 at 9:32 am tashia Hernandez - \"the wireless customer you are calling is not available. " "Please try again later.\" letter mailed. [facility]  "

## 2018-10-09 NOTE — LETTER
October 12, 2018      David Juarez  [address]    [place] [address]        Dear David,  Your bloodwork looked great.  I will see you in 3 months    Please see below for your test results.    Resulted Orders   Lipid Panel ([facility])   Result Value Ref Range    Cholesterol 230.0 (H) 0.0 - 200.0 mg/dL    Cholesterol/HDL Ratio 2.8 0.0 - 5.0    HDL Cholesterol 83.0 >40.0 mg/dL    LDL Cholesterol Calculated 134 (H) 0 - 129 mg/dL    Triglycerides 64.6 0.0 - 150.0 mg/dL    VLDL Cholesterol 12.9 7.0 - 32.0 mg/dL   Hemoglobin A1c ([facility])   Result Value Ref Range    Hemoglobin A1C 5.9 (H) 4.1 - 5.7 %   Basic Metabolic Panel ([facility])   Result Value Ref Range    Urea Nitrogen 11.7 7.0 - 21.0 mg/dL    Calcium 9.5 8.5 - 10.1 mg/dL    Chloride 96.6 (L) 98.0 - 110.0 mmol/L    Carbon Dioxide 28.5 20.0 - 32.0 mmol/L    Creatinine 0.6 (L) 0.7 - 1.3 mg/dL    Glucose 102.5 (H) 70.0 - 99.0 mg'dL    Potassium 4.2 3.2 - 4.6 mmol/dL    Sodium 130.8 (L) 132.0 - 142.0 mmol/L    GFR Estimate >90 >60.0 mL/min/1.7 m2    GFR Estimate If Black >90 >60.0 mL/min/1.7 m2   HIV Ag/Ab Screen [place] ([facility])   Result Value Ref Range    HIV Antigen/Antibody Negative Negative    Narrative    Test performed by:  [facility] [address] [place] [address]  Method is Abbott HIV Ag/Ab for the detection of HIV p24 antigen, HIV-1   antibodies and HIV-2 antibodies.       If you have any questions, please call the clinic to make an appointment.    Sincerely,    Peyman James MD

## 2018-10-09 NOTE — MR AVS SNAPSHOT
After Visit Summary   10/9/2018    David Juarez    MRN: [id number]           Patient Information     Date Of Birth          1970        Visit Information        Provider Department      10/9/2018 10:00 AM Peyman James MD [facility]        Today's Diagnoses     Encounter for routine adult medical exam with abnormal findings    -  1    Type 2 diabetes mellitus without complication, without long-term current use of insulin (H)        Benign essential hypertension        Paranoid schizophrenia (H)        Hyperlipidemia, unspecified hyperlipidemia type        Back muscle spasm          Care Instructions      Preventive Health Recommendations  Male Ages 40 to 49    Yearly exam:             See your health care provider every year in order to  o   Review health changes.   o   Discuss preventive care.    o   Review your medicines if your doctor has prescribed any.    You should be tested each year for STDs (sexually transmitted diseases) if you re at risk.     Have a cholesterol test every 5 years.     Have a colonoscopy (test for colon cancer) if someone in your family has had colon cancer or polyps before age 50.     After age 45, have a diabetes test (fasting glucose). If you are at risk for diabetes, you should have this test every 3 years.      Talk with your health care provider about whether or not a prostate cancer screening test (PSA) is right for you.    Shots: Get a flu shot each year. Get a tetanus shot every 10 years.     Nutrition:    Eat at least 5 servings of fruits and vegetables daily.     Eat whole-grain bread, whole-wheat pasta and brown rice instead of white grains and rice.     Get adequate Calcium and Vitamin D.     Lifestyle    Exercise for at least 150 minutes a week (30 minutes a day, 5 days a week). This will help you control your weight and prevent disease.     Limit alcohol to one drink per day.     No smoking.     Wear sunscreen to prevent skin cancer.     See  "your dentist every six months for an exam and cleaning.   MENTAL HEALTH REFERRAL  October 9, 2018 at 10:40 am Referral forwarded to CHARAN Rodas who will process with Behavioral Health and assist with scheduling. [facility]    OPHTHALMOLOGY ADULT REFERRAL  October 9, 2018 at 10:41 am called David - \"the wireless customer you are calling is not available. Please try again later.\" [facility]  October 10, 2018 at 2:27 pm called David - \"the wireless customer you are calling is not available. Please try again later.\" [facility]          Follow-ups after your visit        Additional Services     MENTAL HEALTH REFERRAL  -       Use this form for behavioral health consults and assessments. The referral coordinator will help to determine whether patients are best served by clinic behavioral health staff or by community providers.    Presenting Problem: depressed mood, anxiety    Currently having suicidal thoughts: No  Previous psych hospitalization: Yes    Type of referral(s) requested (indicate all that apply):  Adult Psychiatry--for long term medication management., needing a higher level of care than primary care can provide        needed:No  Language: English            OPHTHALMOLOGY ADULT REFERRAL       Your provider has referred you to: [facility]: [facility] [phone]   [email or web address]    Please be aware that coverage of these services is subject to the terms and limitations of your health insurance plan.  Call member services at your health plan with any benefit or coverage questions.      Please bring the following with you to your appointment:    (1) Any X-Rays, CTs or MRIs which have been performed.  Contact the facility where they were done to arrange for  prior to your scheduled appointment.    (2) List of current medications  (3) This referral request   (4) Any documents/labs given to you for this referral                  Who to contact     Please call " your clinic at [phone] to:    Ask questions about your health    Make or cancel appointments    Discuss your medicines    Learn about your test results    Speak to your doctor            Additional Information About Your Visit        MyChart Information     [employer] is an electronic gateway that provides easy, online access to your medical records. With [employer], you can request a clinic appointment, read your test results, renew a prescription or communicate with your care team.     To sign up for [employer] visit the website at [email or web address]   You will be asked to enter the access code listed below, as well as some personal information. Please follow the directions to create your username and password.     Your access code is: [email or web address]  Expires: 2019  8:42 AM     Your access code will  in 90 days. If you need help or a new code, please contact your [facility] or call [phone] for assistance.        Care EveryWhere ID     This is your Care EveryWhere ID. This could be used by other organizations to access your [facility] medical records  [id number]        Your Vitals Were     Pulse Temperature Respirations Pulse Oximetry BMI (Body Mass Index)       64 97.7  F (36.5  C) (Oral) 16 97% 36.7 kg/m2        Blood Pressure from Last 3 Encounters:   10/09/18 122/84   17 140/86   17 122/84    Weight from Last 3 Encounters:   10/09/18 239 lb 9.6 oz (108.7 kg)   17 248 lb 9.6 oz (112.8 kg)   17 255 lb 6.4 oz (115.8 kg)              We Performed the Following     Basic Metabolic Panel ([place])     Hemoglobin A1c ([facility])     HIV Ag/Ab Screen [place] ([facility])     Lipid Panel ([place])     MENTAL HEALTH REFERRAL  -     OPHTHALMOLOGY ADULT REFERRAL          Today's Medication Changes          These changes are accurate as of 10/9/18 11:59 PM.  If you have any questions, ask your nurse or doctor.               Start taking these medicines.         Dose/Directions    cyclobenzaprine 10 MG tablet   Commonly known as:  FLEXERIL   Used for:  Back muscle spasm   Started by:  Peyman James MD        Dose:  10 mg   Take 1 tablet (10 mg) by mouth 3 times daily as needed for muscle spasms   Quantity:  30 tablet   Refills:  0       DULoxetine 60 MG EC capsule   Commonly known as:  CYMBALTA   Used for:  Paranoid schizophrenia (H)   Started by:  Peyman James MD        Dose:  60 mg   Take 1 capsule (60 mg) by mouth daily   Quantity:  90 capsule   Refills:  3       metFORMIN 500 MG tablet   Commonly known as:  GLUCOPHAGE   Used for:  Type 2 diabetes mellitus without complication, without long-term current use of insulin (H)   Started by:  Peyman James MD        Dose:  500 mg   Take 1 tablet (500 mg) by mouth 2 times daily (with meals)   Quantity:  180 tablet   Refills:  3       metoprolol tartrate 25 MG tablet   Commonly known as:  LOPRESSOR   Used for:  Benign essential hypertension   Started by:  Peyman James MD        Dose:  25 mg   Take 1 tablet (25 mg) by mouth 2 times daily   Quantity:  180 tablet   Refills:  3         These medicines have changed or have updated prescriptions.        Dose/Directions    lisinopril 10 MG tablet   Commonly known as:  PRINIVIL/ZESTRIL   This may have changed:    - medication strength  - how much to take   Used for:  Benign essential hypertension   Changed by:  Peyman James MD        Dose:  10 mg   Take 1 tablet (10 mg) by mouth daily   Quantity:  90 tablet   Refills:  3       traZODone 100 MG tablet   Commonly known as:  DESYREL   This may have changed:    - how much to take  - when to take this  - reasons to take this   Used for:  Paranoid schizophrenia (H)   Changed by:  Peyman James MD        Dose:  100 mg   Take 1 tablet (100 mg) by mouth nightly as needed for sleep   Quantity:  90 tablet   Refills:  3         Stop taking these medicines if you haven't already. Please contact your care team if you have  questions.     atorvastatin 40 MG tablet   Commonly known as:  LIPITOR   Stopped by:  Peyman James MD           clotrimazole 1 % cream   Commonly known as:  LOTRIMIN   Stopped by:  Peyman James MD           METOPROLOL SUCCINATE ER PO   Stopped by:  Peyman James MD           omeprazole 40 MG capsule   Commonly known as:  priLOSEC   Stopped by:  Peyman James MD           traMADol 50 MG tablet   Commonly known as:  ULTRAM   Stopped by:  Peyman James MD                Where to get your medicines      These medications were sent to [facility] - [place], [place] - [address], [place] [address]     Phone:  [phone]     cyclobenzaprine 10 MG tablet    DULoxetine 60 MG EC capsule    lisinopril 10 MG tablet    metFORMIN 500 MG tablet    metoprolol tartrate 25 MG tablet    traZODone 100 MG tablet                Primary Care Provider Office Phone # Fax #    Peyman James [phone]       [address] [place] [address]        Equal Access to Services     [facility]: Hadii aad ku hadasho Soomaali, waaxda luqadaha, qaybta kaalmada adeegyada, waxay idiin hayaan moiz jung . So [facility] [phone].    ATENCIÓN: Si habla español, tiene a tavares disposición servicios gratuitos de asistencia lingüística. [facility] [phone].    We comply with applicable federal civil rights laws and [place] laws. We do not discriminate on the basis of race, color, national origin, age, disability, sex, sexual orientation, or gender identity.            Thank you!     Thank you for choosing [facility]  for your care. Our goal is always to provide you with excellent care. Hearing back from our patients is one way we can continue to improve our services. Please take a few minutes to complete the written survey that you may receive in the mail after your visit with us. Thank you!             Your Updated Medication List - Protect others around you: Learn how  to safely use, store and throw away your medicines at www.disposemymeds.org.          This list is accurate as of 10/9/18 11:59 PM.  Always use your most recent med list.                   Brand Name Dispense Instructions for use Diagnosis    cyclobenzaprine 10 MG tablet    FLEXERIL    30 tablet    Take 1 tablet (10 mg) by mouth 3 times daily as needed for muscle spasms    Back muscle spasm       DULoxetine 60 MG EC capsule    CYMBALTA    90 capsule    Take 1 capsule (60 mg) by mouth daily    Paranoid schizophrenia (H)       lisinopril 10 MG tablet    PRINIVIL/ZESTRIL    90 tablet    Take 1 tablet (10 mg) by mouth daily    Benign essential hypertension       metFORMIN 500 MG tablet    GLUCOPHAGE    180 tablet    Take 1 tablet (500 mg) by mouth 2 times daily (with meals)    Type 2 diabetes mellitus without complication, without long-term current use of insulin (H)       metoprolol tartrate 25 MG tablet    LOPRESSOR    180 tablet    Take 1 tablet (25 mg) by mouth 2 times daily    Benign essential hypertension       risperiDONE 2 MG tablet    risperDAL    30 tablet    Take 1 tablet (2 mg) by mouth At Bedtime    Paranoid schizophrenia (H)       traZODone 100 MG tablet    DESYREL    90 tablet    Take 1 tablet (100 mg) by mouth nightly as needed for sleep    Paranoid schizophrenia (H)

## 2018-10-09 NOTE — PROGRESS NOTES
Male Physical Note  Pt is a 48 yo male with a hx of Schizophrenia, MDD, HTN, HLD, ETOH abuse presenting for a complete physical exam. Pt is currently undergoing inpatient treatment for ETOH and cannabis in [place]. Pt is requesting referral to a mental health  today.     Concerns today:   - Back pain: sharp 8/10, R near shoulder blade, made worse by lifting, present at rest, first noticed months ago, hasn't tried heat/cold or any medications.       ROS:                      CONSTITUTIONAL: no fatigue, no unexpected change in weight  SKIN: no worrisome rashes, no worrisome moles, no worrisome lesions  EYES: no acute vision problems or changes  ENT: no ear problems, no mouth problems, sore throat 3-4 days, runny nose for a week  RESP: cough productive of green phlegm, no shortness of breath  CV: no chest pain, no palpitations  GI: no nausea, no vomiting, no constipation, no diarrhea  Psych: no visual hallucinations, infrequent command auditory hallucinations      No past medical history on file.     Family History   Problem Relation Age of Onset     Diabetes No family hx of      Coronary Artery Disease No family hx of      Other Cancer No family hx of     Reviewed no other significant FH           Family History and past Medical History reviewed and unchanged/updated.    Social History   Substance Use Topics     Smoking status: Current Every Day Smoker     Packs/day: 0.10     Years: 5.00     Types: Cigarettes     Smokeless tobacco: Never Used      Comment: pt is not ready to quit at this time.     Alcohol use Not on file     Single  Children ? yes - 4, live in [place]     Has anyone hurt you physically, for example by pushing, hitting, slapping or kicking you or forcing you to have sex? Denies  Do you feel threatened or controlled by a partner, ex-partner or anyone in your life? Denies    RISK BEHAVIORS AND HEALTHY HABITS:  Tobacco Use/Smoking: None and Details 1/2ppd  Illicit Drug Use: None  ETOH:  None  Do you use alcohol? No  Sexually Active: Yes, uses protection  Diet (5-7 servings of fruits/veg daily): Yes   Exercise (30 min accumulated most days): No  Dental Care: No   Calcium 1500 mg/d:  No  Seat Belt Use: Yes     Cholesterol Level (>44 yo or at risk):  Recommended and patient accepted testing.    CV Risk based on Pooled Cohort Risk:      Immunization History   Administered Date(s) Administered     Influenza (IIV3) PF 11/10/2006, 11/08/2011, 12/06/2013     TD (ADULT, 7+) 11/10/2006     TDAP Vaccine (Boostrix) 12/06/2013    Reviewed Immunization Record Today      EXAMINATION:  There were no vitals taken for this visit.  GENERAL: healthy, alert and no distress  EYES: Eyes grossly normal to inspection, no scleral jaundice, no conjunctival pallor    HENT: ear canals- normal, moderate amount of wax build up; TMs- normal;   Nose- normal, clear congestion noted; Mouth- no ulcers, no lesions  NECK: no tenderness, no adenopathy, no asymmetry, no masses, no stiffness;  RESP: lungs clear to auscultation - no rales, no rhonchi, no wheezes  CV: regular rates and rhythm, normal S1 S2, no S3 or S4 and no murmur, no click or rub -  ABDOMEN: soft, no tenderness, no  hepatosplenomegaly, no masses, normal bowel sounds  - Pt has large midline scar from a prior surgery with 2 cystic epidermoid inclusions with open caps with minimal serous drainage, no ulceration, or surrounding erythema.    MS: extremities- no gross deformities noted,   BACK: no tenderness to palpation  SKIN: no suspicious lesions, no rashes  NEURO: strength and tone- normal, sensory exam- grossly normal, normal pinprick sensation in distal LEs, mentation- intact, speech- normal  PSYCH: Alert and oriented times 3; speech- coherent , normal rate and volume; able to articulate logical thoughts, able to abstract reason, no tangential thoughts, no hallucinations or delusions, affect- normal  LYMPHATICS: ant. cervical- normal, post. cervical-  normal    ASSESSMENT:  1. Health Care Maintenance: Normal Physical Exam    PLAN:  1.Lipid panel ordered.  2. Hb A1c  3. Referrals to Opthalmology and Psych   4. For back pain - presentation consistent with muscular complaint   Pt prescribed, Flexaril 10mg Q8h PRN

## 2018-10-09 NOTE — LETTER
October 11, 2018      David Juarez  [address]    [place] [address]        Dear David,    We tried calling you regarding an appointment for OPHTHALMOLOGY referral however we were unable to reach you.    Please call us at [facility] [phone] to assist you with schedule this appointment.      Sincerely,    [facility]  Referral Department    [phone]

## 2018-10-10 ENCOUNTER — DOCUMENTATION ONLY (OUTPATIENT)
Dept: PSYCHOLOGY | Facility: CLINIC | Age: 48
End: 2018-10-10

## 2018-10-10 ENCOUNTER — TELEPHONE (OUTPATIENT)
Dept: FAMILY MEDICINE | Facility: CLINIC | Age: 48
End: 2018-10-10

## 2018-10-10 NOTE — PROGRESS NOTES
Behavioral Health Team,    Patient is being referred for mental health services by their provider Dr. James. Please advise if we are able to see patient for in house treatment or if a community option would be best.    Thank you.     Candy  Care Coordinator

## 2018-10-10 NOTE — TELEPHONE ENCOUNTER
Spoke with Hanna in the Health Office.  They needed clarification that patient is now on Lisinopril 10 mg as they still have the order for Lisinopril 20 mg.  Reviewed patient's chart and noted that the 20 mg had been discontinued yesterday and that Lisinopril 10 mg had been started.  Is requesting that the order be faxed to them.  Fax number [phone] attn: health office.  Printed off Lisinopril order from 10/9/18 and faxed to the Health Office.    Routed to Dr. James/MOODY Jaeger RN

## 2018-10-10 NOTE — TELEPHONE ENCOUNTER
[facility] phone call message- medication clarification/question:    Full Medication Name: lisinopril (PRINIVIL/ZESTRIL) 10 MG tablet       Dose:Take 1 tablet (10 mg) by mouth daily - Oral     Question: Pt came back with prescription for 10 mg and they have down 20 mg.  They have no paperwork for a med change. Would like to clarify.    Pharmacy confirmed as    [facility] - [place], [place] - [address] [id number] - [address]: Yes    OK to leave a message on voice mail? Yes    Primary language: English      needed? No    Call taken on October 10, 2018 at 9:58 AM by Patty Porter

## 2018-10-11 NOTE — PROGRESS NOTES
Social Work Note:    Data and Intervention: SW was consulted to meet with patient to facilitate a referral for a MH  and potentially other community supports.     Present for the conversation was patient, David. He invited his friend into the discussion about half way through to provide information regarding a phone number to contact David at.     David is currently living at [facility], an inpatient CD treatment facility. He is at the men's house on [facility] in [place]. He states that he will continue living their and programming for the foreseeable future. He does not know when he will complete treatment or move out. David indicates he used to have a MH . He would like to get reconnected with one. Due to living in [facility], he would have to be referred through [place]. SW gathered OMER for [facility] as well as [facility].     SW reviewed referral for Adult Mental Services in [facility], with patient. Per the website, it requires a phone call for an initial referral/intake. David consented to this SW first speaking with team at [facility] to find out of they can complete the referral or if they have already as he states he talked to them about it as well. If they have not, this SW will call [facility] and do so.     SW gathered OMER for [facility] and [facility].     Contacts for [facility] include:  Harper Cadet Stephanie RN, or Yuliet  P:     [address] [facility] [address]    Assessment and Plan:     1.) Gathered OMER's for [facility] and [facility] MH Services- will fax    2.) SW will coordinate with [facility] to find out if they entered the MH CM referral yet, if not, this SW will call [facility] to do so    Candy Cee

## 2018-10-12 NOTE — PROGRESS NOTES
Review of Dr. James's order and note indicates that this is a referral for psychiatry in the community.  Per additional social work consultation with Candy Cee, she is currently working with Mr. Juarez to re-establish mental health case management in [facility] as this is where he is currently living.  Assuming he would like to keep all his mental health services in [facility], will ask Candy to work with him to set up psychiatry at one of the following locations:    [facility]  [address]  [place] [address] [phone]    [facility]   [address] [place], [place]    [phone]    Abiel Elaine  Phone: [phone]  Address: [address] [place] [address]    [place] [facility] - psychiatry only for people who also go there for therapy  [facility] Location  [address] [place], [place]  [phone]    Let me know if you have questions or would like additional follow up from me.  Thanks!      Enedina Lagunas, Ph.D.,LP     Disclaimer  The above treatment recommendations are based on consultation with the patient's primary care provider and a review of relevant information in EPIC.? I have not personally examined the patient.? All recommendations should be implemented with considerations of the patient's relevant prior history and current clinical status.  Please contact me with any questions about the care of this patient.

## 2019-04-18 ENCOUNTER — TRANSFERRED RECORDS (OUTPATIENT)
Dept: HEALTH INFORMATION MANAGEMENT | Facility: CLINIC | Age: 49
End: 2019-04-18

## 2019-04-19 ENCOUNTER — TRANSFERRED RECORDS (OUTPATIENT)
Dept: HEALTH INFORMATION MANAGEMENT | Facility: CLINIC | Age: 49
End: 2019-04-19

## 2019-05-20 ENCOUNTER — TRANSFERRED RECORDS (OUTPATIENT)
Dept: HEALTH INFORMATION MANAGEMENT | Facility: CLINIC | Age: 49
End: 2019-05-20

## 2019-05-21 ENCOUNTER — TRANSFERRED RECORDS (OUTPATIENT)
Dept: HEALTH INFORMATION MANAGEMENT | Facility: CLINIC | Age: 49
End: 2019-05-21

## 2019-07-12 ENCOUNTER — OFFICE VISIT (OUTPATIENT)
Dept: FAMILY MEDICINE | Facility: CLINIC | Age: 49
End: 2019-07-12
Payer: MEDICAID

## 2019-07-12 VITALS
WEIGHT: 232.1 LBS | HEIGHT: 69 IN | TEMPERATURE: 97 F | RESPIRATION RATE: 20 BRPM | HEART RATE: 101 BPM | SYSTOLIC BLOOD PRESSURE: 112 MMHG | OXYGEN SATURATION: 97 % | BODY MASS INDEX: 34.38 KG/M2 | DIASTOLIC BLOOD PRESSURE: 76 MMHG

## 2019-07-12 DIAGNOSIS — F51.04 PSYCHOPHYSIOLOGICAL INSOMNIA: ICD-10-CM

## 2019-07-12 DIAGNOSIS — Z91.51 HX OF SUICIDE ATTEMPT: ICD-10-CM

## 2019-07-12 DIAGNOSIS — F10.10 ALCOHOL ABUSE: ICD-10-CM

## 2019-07-12 DIAGNOSIS — F14.20 COCAINE USE DISORDER, SEVERE, DEPENDENCE (H): ICD-10-CM

## 2019-07-12 DIAGNOSIS — F25.1 SCHIZOAFFECTIVE DISORDER, DEPRESSIVE TYPE (H): ICD-10-CM

## 2019-07-12 DIAGNOSIS — F20.0 PARANOID SCHIZOPHRENIA (H): ICD-10-CM

## 2019-07-12 DIAGNOSIS — I10 BENIGN ESSENTIAL HYPERTENSION: Primary | ICD-10-CM

## 2019-07-12 DIAGNOSIS — F12.20 SEVERE CANNABIS USE DISORDER (H): ICD-10-CM

## 2019-07-12 DIAGNOSIS — E78.5 HYPERLIPIDEMIA, UNSPECIFIED HYPERLIPIDEMIA TYPE: ICD-10-CM

## 2019-07-12 DIAGNOSIS — Z11.3 SCREEN FOR STD (SEXUALLY TRANSMITTED DISEASE): ICD-10-CM

## 2019-07-12 DIAGNOSIS — E11.9 TYPE 2 DIABETES MELLITUS WITHOUT COMPLICATION, WITHOUT LONG-TERM CURRENT USE OF INSULIN (H): ICD-10-CM

## 2019-07-12 DIAGNOSIS — Z12.5 SCREENING FOR PROSTATE CANCER: ICD-10-CM

## 2019-07-12 DIAGNOSIS — F17.200 TOBACCO USE DISORDER: ICD-10-CM

## 2019-07-12 PROBLEM — T50.902A SUICIDE ATTEMPT BY DRUG INGESTION, INITIAL ENCOUNTER (H): Status: ACTIVE | Noted: 2017-08-28

## 2019-07-12 PROBLEM — F19.90 SUBSTANCE USE DISORDER: Status: ACTIVE | Noted: 2018-04-10

## 2019-07-12 PROBLEM — F60.2 ANTISOCIAL PERSONALITY DISORDER (H): Status: ACTIVE | Noted: 2017-10-16

## 2019-07-12 PROBLEM — Z91.148 H/O MEDICATION NONCOMPLIANCE: Status: ACTIVE | Noted: 2017-04-12

## 2019-07-12 PROBLEM — F10.20 ALCOHOL USE DISORDER, MODERATE, DEPENDENCE (H): Status: ACTIVE | Noted: 2017-10-16

## 2019-07-12 PROBLEM — F15.951 AMPHETAMINE AND PSYCHOSTIMULANT-INDUCED PSYCHOSIS WITH HALLUCINATIONS (H): Status: ACTIVE | Noted: 2019-05-21

## 2019-07-12 PROBLEM — R45.851 SUICIDAL IDEATIONS: Status: ACTIVE | Noted: 2018-01-16

## 2019-07-12 PROBLEM — T50.902A SUICIDE ATTEMPT BY DRUG INGESTION, INITIAL ENCOUNTER (H): Status: RESOLVED | Noted: 2017-08-28 | Resolved: 2019-07-12

## 2019-07-12 PROBLEM — R45.851 SUICIDAL IDEATIONS: Status: RESOLVED | Noted: 2018-01-16 | Resolved: 2019-07-12

## 2019-07-12 PROBLEM — F33.2 SEVERE EPISODE OF RECURRENT MAJOR DEPRESSIVE DISORDER, WITHOUT PSYCHOTIC FEATURES (H): Status: ACTIVE | Noted: 2017-08-28

## 2019-07-12 PROBLEM — F33.3 SEVERE RECURRENT DEPRESSION WITH PSYCHOSIS (H): Status: ACTIVE | Noted: 2018-04-10

## 2019-07-12 LAB
ALBUMIN SERPL-MCNC: 3.2 G/DL (ref 3.4–5)
ALP SERPL-CCNC: 90 U/L (ref 40–150)
ALT SERPL W P-5'-P-CCNC: 37 U/L (ref 0–70)
ANION GAP SERPL CALCULATED.3IONS-SCNC: 7 MMOL/L (ref 3–14)
AST SERPL W P-5'-P-CCNC: 19 U/L (ref 0–45)
BILIRUB SERPL-MCNC: 0.3 MG/DL (ref 0.2–1.3)
BUN SERPL-MCNC: 11 MG/DL (ref 7–30)
CALCIUM SERPL-MCNC: 8.9 MG/DL (ref 8.5–10.1)
CHLORIDE SERPL-SCNC: 103 MMOL/L (ref 94–109)
CHOLEST SERPL-MCNC: 237 MG/DL
CO2 SERPL-SCNC: 28 MMOL/L (ref 20–32)
CREAT SERPL-MCNC: 0.65 MG/DL (ref 0.66–1.25)
CREAT UR-MCNC: 93 MG/DL
GFR SERPL CREATININE-BSD FRML MDRD: >90 ML/MIN/{1.73_M2}
GLUCOSE SERPL-MCNC: 173 MG/DL (ref 70–99)
HBA1C MFR BLD: 7 % (ref 0–5.6)
HBV SURFACE AG SERPL QL IA: NONREACTIVE
HCV AB SERPL QL IA: NONREACTIVE
HDLC SERPL-MCNC: 83 MG/DL
HIV 1+2 AB+HIV1 P24 AG SERPL QL IA: NONREACTIVE
LDLC SERPL CALC-MCNC: 130 MG/DL
MICROALBUMIN UR-MCNC: <5 MG/L
MICROALBUMIN/CREAT UR: NORMAL MG/G CR (ref 0–17)
NONHDLC SERPL-MCNC: 154 MG/DL
POTASSIUM SERPL-SCNC: 4.4 MMOL/L (ref 3.4–5.3)
PROT SERPL-MCNC: 7.1 G/DL (ref 6.8–8.8)
PSA SERPL-ACNC: 0.47 UG/L (ref 0–4)
SODIUM SERPL-SCNC: 138 MMOL/L (ref 133–144)
TRIGL SERPL-MCNC: 120 MG/DL
TSH SERPL DL<=0.005 MIU/L-ACNC: 0.84 MU/L (ref 0.4–4)

## 2019-07-12 PROCEDURE — 99207 C FOOT EXAM  NO CHARGE: CPT | Performed by: FAMILY MEDICINE

## 2019-07-12 PROCEDURE — 87340 HEPATITIS B SURFACE AG IA: CPT | Performed by: FAMILY MEDICINE

## 2019-07-12 PROCEDURE — 80061 LIPID PANEL: CPT | Performed by: FAMILY MEDICINE

## 2019-07-12 PROCEDURE — 83036 HEMOGLOBIN GLYCOSYLATED A1C: CPT | Performed by: FAMILY MEDICINE

## 2019-07-12 PROCEDURE — 36415 COLL VENOUS BLD VENIPUNCTURE: CPT | Performed by: FAMILY MEDICINE

## 2019-07-12 PROCEDURE — G0103 PSA SCREENING: HCPCS | Performed by: FAMILY MEDICINE

## 2019-07-12 PROCEDURE — 84443 ASSAY THYROID STIM HORMONE: CPT | Performed by: FAMILY MEDICINE

## 2019-07-12 PROCEDURE — 82043 UR ALBUMIN QUANTITATIVE: CPT | Performed by: FAMILY MEDICINE

## 2019-07-12 PROCEDURE — 86780 TREPONEMA PALLIDUM: CPT | Performed by: FAMILY MEDICINE

## 2019-07-12 PROCEDURE — 99204 OFFICE O/P NEW MOD 45 MIN: CPT | Performed by: FAMILY MEDICINE

## 2019-07-12 PROCEDURE — 80053 COMPREHEN METABOLIC PANEL: CPT | Performed by: FAMILY MEDICINE

## 2019-07-12 PROCEDURE — 87389 HIV-1 AG W/HIV-1&-2 AB AG IA: CPT | Performed by: FAMILY MEDICINE

## 2019-07-12 PROCEDURE — 86803 HEPATITIS C AB TEST: CPT | Performed by: FAMILY MEDICINE

## 2019-07-12 RX ORDER — RISPERIDONE 2 MG/1
2 TABLET ORAL 2 TIMES DAILY
Qty: 60 TABLET | Refills: 1 | Status: SHIPPED | OUTPATIENT
Start: 2019-07-12 | End: 2020-08-31

## 2019-07-12 RX ORDER — ATORVASTATIN CALCIUM 40 MG/1
40 TABLET, FILM COATED ORAL
COMMUNITY
Start: 2017-08-29 | End: 2019-07-12

## 2019-07-12 RX ORDER — TRAZODONE HYDROCHLORIDE 50 MG/1
50-100 TABLET, FILM COATED ORAL
Qty: 60 TABLET | Refills: 1 | Status: SHIPPED | OUTPATIENT
Start: 2019-07-12 | End: 2020-08-31

## 2019-07-12 RX ORDER — LISINOPRIL 2.5 MG/1
10 TABLET ORAL DAILY
Qty: 30 TABLET | Refills: 1 | Status: SHIPPED | OUTPATIENT
Start: 2019-07-12 | End: 2020-04-16

## 2019-07-12 RX ORDER — OLANZAPINE 5 MG/1
5 TABLET ORAL
COMMUNITY
Start: 2018-06-15 | End: 2019-07-12

## 2019-07-12 RX ORDER — ATORVASTATIN CALCIUM 40 MG/1
40 TABLET, FILM COATED ORAL DAILY
Qty: 30 TABLET | Refills: 1 | Status: SHIPPED | OUTPATIENT
Start: 2019-07-12 | End: 2019-10-24

## 2019-07-12 RX ORDER — BUSPIRONE HYDROCHLORIDE 10 MG/1
10 TABLET ORAL
COMMUNITY
Start: 2018-05-18 | End: 2019-07-12

## 2019-07-12 RX ORDER — DULOXETIN HYDROCHLORIDE 60 MG/1
60 CAPSULE, DELAYED RELEASE ORAL DAILY
Qty: 30 CAPSULE | Refills: 1 | Status: SHIPPED | OUTPATIENT
Start: 2019-07-12 | End: 2019-10-24

## 2019-07-12 ASSESSMENT — PAIN SCALES - GENERAL: PAINLEVEL: NO PAIN (0)

## 2019-07-12 ASSESSMENT — MIFFLIN-ST. JEOR: SCORE: 1919.76

## 2019-07-12 NOTE — LETTER
My Depression Action Plan  Name: David Juarez   Date of Birth 1970  Date: 7/12/2019    My doctor: Peyman James   My clinic: [address] [address] [phone]          GREEN    ZONE   Good Control    What it looks like:     Things are going generally well. You have normal up s and down s. You may even feel depressed from time to time, but bad moods usually last less than a day.   What you need to do:  1. Continue to care for yourself (see self care plan)  2. Check your depression survival kit and update it as needed  3. Follow your physician s recommendations including any medication.  4. Do not stop taking medication unless you consult with your physician first.           YELLOW         ZONE Getting Worse    What it looks like:     Depression is starting to interfere with your life.     It may be hard to get out of bed; you may be starting to isolate yourself from others.    Symptoms of depression are starting to last most all day and this has happened for several days.     You may have suicidal thoughts but they are not constant.   What you need to do:     1. Call your care team, your response to treatment will improve if you keep your care team informed of your progress. Yellow periods are signs an adjustment may need to be made.     2. Continue your self-care, even if you have to fake it!    3. Talk to someone in your support network    4. Open up your depression survival kit           RED    ZONE Medical Alert - Get Help    What it looks like:     Depression is seriously interfering with your life.     You may experience these or other symptoms: You can t get out of bed most days, can t work or engage in other necessary activities, you have trouble taking care of basic hygiene, or basic responsibilities, thoughts of suicide or death that will not go away, self-injurious behavior.     What you need to do:  1. Call your care team and  request a same-day appointment. If they are not available (weekends or after hours) call your local crisis line, emergency room or 911.            Depression Self Care Plan / Survival Kit    Self-Care for Depression  Here s the deal. Your body and mind are really not as separate as most people think.  What you do and think affects how you feel and how you feel influences what you do and think. This means if you do things that people who feel good do, it will help you feel better.  Sometimes this is all it takes.  There is also a place for medication and therapy depending on how severe your depression is, so be sure to consult with your medical provider and/ or Behavioral Health Consultant if your symptoms are worsening or not improving.     In order to better manage my stress, I will:    Exercise  Get some form of exercise, every day. This will help reduce pain and release endorphins, the  feel good  chemicals in your brain. This is almost as good as taking antidepressants!  This is not the same as joining a gym and then never going! (they count on that by the way ) It can be as simple as just going for a walk or doing some gardening, anything that will get you moving.      Hygiene   Maintain good hygiene (Get out of bed in the morning, Make your bed, Brush your teeth, Take a shower, and Get dressed like you were going to work, even if you are unemployed).  If your clothes don't fit try to get ones that do.    Diet  I will strive to eat foods that are good for me, drink plenty of water, and avoid excessive sugar, caffeine, alcohol, and other mood-altering substances.  Some foods that are helpful in depression are: complex carbohydrates, B vitamins, flaxseed, fish or fish oil, fresh fruits and vegetables.    Psychotherapy  I agree to participate in Individual Therapy (if recommended).    Medication  If prescribed medications, I agree to take them.  Missing doses can result in serious side effects.  I understand that  drinking alcohol, or other illicit drug use, may cause potential side effects.  I will not stop my medication abruptly without first discussing it with my provider.    Staying Connected With Others  I will stay in touch with my friends, family members, and my primary care provider/team.    Use your imagination  Be creative.  We all have a creative side; it doesn t matter if it s oil painting, sand castles, or mud pies! This will also kick up the endorphins.    Witness Beauty  (AKA stop and smell the roses) Take a look outside, even in mid-winter. Notice colors, textures. Watch the squirrels and birds.     Service to others  Be of service to others.  There is always someone else in need.  By helping others we can  get out of ourselves  and remember the really important things.  This also provides opportunities for practicing all the other parts of the program.    Humor  Laugh and be silly!  Adjust your TV habits for less news and crime-drama and more comedy.    Control your stress  Try breathing deep, massage therapy, biofeedback, and meditation. Find time to relax each day.     My support system    Clinic Contact:  Phone number:    Contact 1:  Phone number:    Contact 2:  Phone number:    Evangelical/:  Phone number:    Therapist:  Phone number:    Local crisis center:    Phone number:    Other community support:  Phone number:
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
## 2019-07-12 NOTE — LETTER
[address] [address] [phone]        July 15, 2019    David Juarez  [address]    [facility] [address]          Dear aDvid,    LAB RESULTS:     The results of your recent Tests were protein level in the urine was negative and that is normal.  Prostate enzyme was normal as well as his thyroid level.  His kidney function test, liver function test and electrolytes were normal.  His cholesterol remained to be elevated.  Please take the Lipitor as prescribed.  Recommend to recheck the cholesterol level in 6 months.  Diabetic is controlled with a hemoglobin A1c of 7.0.  It is however went up from 9.5 about 9 months ago.  Please be sure to take the metformin and continue to work on daily exercise, healthy diet and weight loss.  If you have any further questions or problems, please contact our office at [phone].        Sincerely,        Micheal Pool M.D.

## 2019-07-13 LAB — T PALLIDUM AB SER QL: NONREACTIVE

## 2019-07-17 ENCOUNTER — TELEPHONE (OUTPATIENT)
Dept: FAMILY MEDICINE | Facility: CLINIC | Age: 49
End: 2019-07-17

## 2019-07-17 NOTE — LETTER
[address] [address]  [phone]        July 17, 2019    David Juarez  [address]  [address] [place] [id number]          Dear David,    LAB RESULTS:     The results of your recent Tests were hepatitis C, syphilis, HIV, hepatitis B were normal/negative. If you have any further questions or problems, please contact our office at [phone].        Sincerely,        Micheal Pool M.D.

## 2019-07-17 NOTE — TELEPHONE ENCOUNTER
I left a message asking patient to return our call.  Please inform patient of the message below.  Emmett Baird, CMA

## 2019-07-17 NOTE — TELEPHONE ENCOUNTER
----- Message from Micheal Lira Mai, MD sent at 7/17/2019  6:42 AM CDT -----  Please let patient know that the screening test for hepatitis C, syphilis, HIV, hepatitis B were normal/negative.

## 2019-07-19 ASSESSMENT — PATIENT HEALTH QUESTIONNAIRE - PHQ9
5. POOR APPETITE OR OVEREATING: NEARLY EVERY DAY
SUM OF ALL RESPONSES TO PHQ QUESTIONS 1-9: 17

## 2019-07-19 ASSESSMENT — ANXIETY QUESTIONNAIRES
5. BEING SO RESTLESS THAT IT IS HARD TO SIT STILL: NEARLY EVERY DAY
6. BECOMING EASILY ANNOYED OR IRRITABLE: NEARLY EVERY DAY
7. FEELING AFRAID AS IF SOMETHING AWFUL MIGHT HAPPEN: NEARLY EVERY DAY
3. WORRYING TOO MUCH ABOUT DIFFERENT THINGS: NEARLY EVERY DAY
2. NOT BEING ABLE TO STOP OR CONTROL WORRYING: NEARLY EVERY DAY
1. FEELING NERVOUS, ANXIOUS, OR ON EDGE: NEARLY EVERY DAY
GAD7 TOTAL SCORE: 21
IF YOU CHECKED OFF ANY PROBLEMS ON THIS QUESTIONNAIRE, HOW DIFFICULT HAVE THESE PROBLEMS MADE IT FOR YOU TO DO YOUR WORK, TAKE CARE OF THINGS AT HOME, OR GET ALONG WITH OTHER PEOPLE: VERY DIFFICULT

## 2019-07-20 ASSESSMENT — ANXIETY QUESTIONNAIRES: GAD7 TOTAL SCORE: 21

## 2019-07-25 DIAGNOSIS — I10 BENIGN ESSENTIAL HYPERTENSION: ICD-10-CM

## 2019-07-25 RX ORDER — LISINOPRIL 10 MG/1
10 TABLET ORAL DAILY
Qty: 90 TABLET | Refills: 0 | Status: SHIPPED | OUTPATIENT
Start: 2019-07-25 | End: 2019-10-24

## 2019-07-25 NOTE — TELEPHONE ENCOUNTER
Reason for Call:  Medication or medication refill:    Do you use a [facility]?  Name of the pharmacy and phone number for the current request:  [facility] [place]     Name of the medication requested: lisinopril (PRINIVIL/ZESTRIL) 2.5 MG tabletNow requesting 10mg Xonce a day     Other request: Emma calling stating that the [facility] cannot find patient medication. Requesting to get a new script sent in for a 10mg tablet x once a day so that insurance will cover it. States patient does not have medication for tomorrow. Please advise     Can we leave a detailed message on this number? YES    Phone number patient can be reached at: Other phone number:  [phone]    Best Time:      Call taken on 7/25/2019 at 10:10 AM by Rubi Rivera

## 2019-10-08 ENCOUNTER — TELEPHONE (OUTPATIENT)
Dept: FAMILY MEDICINE | Facility: CLINIC | Age: 49
End: 2019-10-08

## 2019-10-08 NOTE — TELEPHONE ENCOUNTER
[facility] phone call message- general phone call:    Reason for call: the psychiatrist office  called  To ask for  the Dr refill the pt's lisinopril  metFORMIN when he comes in to his next appointment he will be in a half way house and to send it to [facility] in [facility] he has      Return call needed: Yes    OK to leave a message on voice mail? Yes    Primary language: English      needed? No    Call taken on October 8, 2019 at 1:22 PM by Jim Chavez    
60

## 2019-10-24 ENCOUNTER — OFFICE VISIT (OUTPATIENT)
Dept: FAMILY MEDICINE | Facility: CLINIC | Age: 49
End: 2019-10-24
Payer: MEDICAID

## 2019-10-24 VITALS
SYSTOLIC BLOOD PRESSURE: 134 MMHG | TEMPERATURE: 97 F | WEIGHT: 253 LBS | RESPIRATION RATE: 16 BRPM | BODY MASS INDEX: 37.25 KG/M2 | OXYGEN SATURATION: 97 % | DIASTOLIC BLOOD PRESSURE: 85 MMHG | HEART RATE: 90 BPM

## 2019-10-24 DIAGNOSIS — Z00.00 ROUTINE GENERAL MEDICAL EXAMINATION AT A HEALTH CARE FACILITY: ICD-10-CM

## 2019-10-24 DIAGNOSIS — E78.5 HYPERLIPIDEMIA, UNSPECIFIED HYPERLIPIDEMIA TYPE: ICD-10-CM

## 2019-10-24 DIAGNOSIS — Z11.3 SCREEN FOR STD (SEXUALLY TRANSMITTED DISEASE): ICD-10-CM

## 2019-10-24 DIAGNOSIS — J30.2 SEASONAL ALLERGIC RHINITIS, UNSPECIFIED TRIGGER: Primary | ICD-10-CM

## 2019-10-24 RX ORDER — ATORVASTATIN CALCIUM 40 MG/1
40 TABLET, FILM COATED ORAL DAILY
Qty: 30 TABLET | Refills: 11 | Status: SHIPPED | OUTPATIENT
Start: 2019-10-24 | End: 2020-04-16

## 2019-10-24 RX ORDER — DIPHENHYDRAMINE HCL 25 MG
25 CAPSULE ORAL EVERY 6 HOURS PRN
Qty: 30 CAPSULE | Refills: 1 | Status: SHIPPED | OUTPATIENT
Start: 2019-10-24 | End: 2022-01-01

## 2019-10-24 NOTE — PROGRESS NOTES
Male Physical Note      Concerns today: In CD inpatient treatment, having allergy symptoms, having chronic back pain        ROS:                      CONSTITUTIONAL: no fatigue, no unexpected change in weight  SKIN: no worrisome rashes, no worrisome moles, no worrisome lesions  EYES: no acute vision problems or changes  ENT: no ear problems, no mouth problems, no throat problems  RESP: no significant cough, no shortness of breath  CV: no chest pain, no palpitations, no new or worsening peripheral edema  GI: no nausea, no vomiting, no constipation, no diarrhea  MUSCULOSKELETAL:history of mechanical low back pain, was on tramadol with good relief of symptoms and would like to resume this  NEURO: no weakness, no dizziness, no syncope, no headaches  ENDOCRINE: h/o diabetes, well controlled, A1C was 7.0 in July  PSYCHIATRIC: H/O schizophrenia, treated by psychiatry, stable at this time    Past Medical History:   Diagnosis Date     Depressive disorder      Diabetes (H)      Hyperlipidemia      Hypertension      Schizophrenia (H)      Suicide attempt by drug ingestion, initial encounter (H) 8/28/2017        Family History   Problem Relation Age of Onset     No Known Problems Mother      Hypertension Father      Unknown/Adopted Maternal Grandmother      Unknown/Adopted Maternal Grandfather      Unknown/Adopted Paternal Grandmother      Unknown/Adopted Paternal Grandfather      No Known Problems Son      Diabetes No family hx of      Coronary Artery Disease No family hx of      Other Cancer No family hx of      Reviewed no other significant FH           Family History and past Medical History reviewed and unchanged/updated.    Social History     Tobacco Use     Smoking status: Current Every Day Smoker     Packs/day: 0.10     Years: 5.00     Pack years: 0.50     Types: Cigarettes     Smokeless tobacco: Never Used     Tobacco comment: pt is not ready to quit at this time.   Substance Use Topics     Alcohol use: Yes      Comment: heavy daily drinker for years, last drink 20     Single  Children ? no    Has anyone hurt you physically, for example by pushing, hitting, slapping or kicking you or forcing you to have sex? Denies  Do you feel threatened or controlled by a partner, ex-partner or anyone in your life? Denies    RISK BEHAVIORS AND HEALTHY HABITS:  Tobacco Use/Smokin cigarettes a day  Illicit Drug Use: None  ETOH: None  Do you use alcohol? No  Sexually Active: No  Diet (5-7 servings of fruits/veg daily): Yes   Exercise (30 min accumulated most days):No  Dental Care: Yes   Calcium 1500 mg/d:  Yes  Seat Belt Use: Yes     Cholesterol Level (>44 yo or at risk):  Date done 2019  Result(s) normal, ASA use (>3% risk in 5 y):  Date done 2019  Result(s) yes and HIV screening:  Recommended and patient declined testing.        CV Risk based on Pooled Cohort Risk:pending       Immunization History   Administered Date(s) Administered     DTaP, Unspecified 2013     Flu, Unspecified 11/10/2006, 2011, 2013     Influenza (IIV3) PF 11/10/2006, 2011, 2013     Mantoux Tuberculin Skin Test 2017, 2018, 2018, 2018     Pneumococcal 23 valent 2016     TD (ADULT, 7+) 11/10/2006, 2017     TDAP Vaccine (Boostrix) 2013     Td (Adult), Adsorbed 11/10/2006     Reviewed Immunization Record Today    EXAMINATION:  /85   Pulse 90   Temp 97  F (36.1  C) (Oral)   Resp 16   Wt 114.8 kg (253 lb)   SpO2 97%   BMI 37.25 kg/m    GENERAL: healthy, alert and no distress  EYES: Eyes grossly normal to inspection, extraocular movements - intact, and PERRL  HENT: ear canals- normal; TMs- normal; Nose- normal; Mouth- no ulcers, no lesions  NECK: no tenderness, no adenopathy, no asymmetry, no masses, no stiffness; thyroid- normal to palpation  RESP: lungs clear to auscultation - no rales, no rhonchi, no wheezes  CV: regular rates and rhythm, normal S1 S2, no S3 or S4 and no  murmur, no click or rub -  ABDOMEN: soft, no tenderness, no  hepatosplenomegaly, no masses, normal bowel sounds. Large midline scar  MS: extremities- no gross deformities noted, no edema, mild venous varicosities in lower extremities  SKIN: no suspicious lesions, no rashes  NEURO: strength and tone- normal, sensory exam- grossly normal, mentation- intact, speech- normal, reflexes- symmetric  BACK: no CVA tenderness, paralumbar tenderness in the lumbar area, poor rom with flexion and extension  PSYCH: Alert and oriented times 3; speech- coherent , normal rate and volume; able to articulate logical thoughts, able to abstract reason, no tangential thoughts, no hallucinations or delusions, affect- normal  LYMPHATICS: ant. cervical- normal, post. cervical- normal, axillary- normal, supraclavicular- normal, inguinal- normal    ASSESSMENT:  1. DM-2 stable  2. HTN - stable  3. Polysubstance abuse - currently in CD treatment  4. Seasonal allergies  5. Mechanical low back pain  6. Schizophrenia - stable    PLAN:  1.continue current cares  2. F/u for CPM initiation  3. Continue CD treatment  4. Continue to follow with Psych  5. Refill meds  6. Start benadryl prn

## 2019-10-25 LAB
C TRACH RRNA SPEC QL NAA+PROBE: NEGATIVE
N GONORRHOEA RRNA SPEC QL NAA+PROBE: NEGATIVE

## 2019-10-31 ENCOUNTER — OFFICE VISIT (OUTPATIENT)
Dept: FAMILY MEDICINE | Facility: CLINIC | Age: 49
End: 2019-10-31
Payer: MEDICAID

## 2019-10-31 VITALS
BODY MASS INDEX: 38.43 KG/M2 | DIASTOLIC BLOOD PRESSURE: 83 MMHG | OXYGEN SATURATION: 97 % | TEMPERATURE: 98.4 F | SYSTOLIC BLOOD PRESSURE: 128 MMHG | RESPIRATION RATE: 16 BRPM | WEIGHT: 261 LBS | HEART RATE: 91 BPM

## 2019-10-31 DIAGNOSIS — M54.59 MECHANICAL LOW BACK PAIN: Primary | ICD-10-CM

## 2019-10-31 DIAGNOSIS — F12.20 SEVERE CANNABIS USE DISORDER (H): Primary | ICD-10-CM

## 2019-10-31 LAB
AMPHETAMINES QUAL: NEGATIVE
BARBITURATES QUAL URINE: NEGATIVE
BENZODIAZEPINE QUAL URINE: NEGATIVE
BUPRENORPHINE QUAL URINE: NEGATIVE
CANNABINOIDS UR QL SCN: NEGATIVE
COCAINE QUAL URINE: NEGATIVE
METHAMPHETAMINE: NEGATIVE
METHODONE QUAL: NEGATIVE
MORPHINE QUAL: NEGATIVE
OXYCODONE QUAL: NEGATIVE
PHENCYCLIDINE: NEGATIVE
PROPOXYPHENE: NEGATIVE
TEMPERATURE OF URINE WAS BETWEEN 90-100 DEGREES F: YES
TRICYCLIC ANTIDEPRESSANTS: NEGATIVE

## 2019-10-31 RX ORDER — TIZANIDINE HYDROCHLORIDE 4 MG/1
4 CAPSULE, GELATIN COATED ORAL 3 TIMES DAILY
Qty: 30 CAPSULE | Refills: 1 | Status: SHIPPED | OUTPATIENT
Start: 2019-10-31 | End: 2019-11-01

## 2019-10-31 NOTE — PATIENT INSTRUCTIONS
10/31/19   PAIN MANAGEMENT REFERRAL  New patient packet needs to be completed and returned to [facility] prior to scheduling new patient appointment.    ** WAITING FOR PATIENT TO FILL OUT PAPERWORK AND  WILL FAX BACK TO ME.     [facility]  [address]   [place] [address]  Phone: [phone]  Fax: [phone]    Aimee Horta

## 2019-10-31 NOTE — PROGRESS NOTES
Chronic Pain Initial Visit         David Juarez is a 48 year old year old who is  here for evaluation and treatment of chronic pain.     David is here for evaluation and to develop a multifaceted chronic pain plan that may or may not include chronic opiate therapy.        Previously been on a pain contract? No   Previous pain diagnosis: YES back pain    Pain location: back pain  Pain onset: 4 years ago  Pain is described as Aching and Throbbing   Pain rating: intensity ranges from 5/10 to 10/10, and Averages 6/10 on a 0-10 scale.  Aggravating factors include: moving around  Relieving factors include: medications, Details:meds    Who lives in your household? alone  Recent family or social stressors:  none noted  Are you currently working ? No  What do you or did you do? [employer]    Sleep:    What is the quality of your sleep? Good   How many hours do you need? 6 How many do you get? 7    Mental Health  Diagnosis of Depression :  No   Other MH Diagnosis?:   YES paranoid Schizophrenia  History of preadolescence sexual abuse?:No      Substance Use   Alcohol Use:Never used / No history  Tobacco Use: 1/2 ppd  History of chemical dependency:   YES , currently in inpatient treatment  Current use of recreational substances cannabis  and cocaine   Any substance abuse in household? No           Family history:Substance use  Family History of alcohol abuse? No   Family History of Illicit substance use? No   Family History of prescription medication  abuse? No     Past pain Treatments   Pain Clinic:  No   Physical Therapy:  No  Psychologist:  Yes see them next month  Relaxation techniques/biofeedback:  Yes hot bath  Chiropractor:  No  Acupuncture:  No  TENs Unit:  No  Injections:  No   Current Exercise: Activity little bit,  2-3 days/week for an average of 45-60 minutes    Previous medication treatments:  Opiates - codeine (Tylenol #3) and tramadol IR  Antiepileptics - none  Antidepressants - risperidone  (Risperdal) and trazadone   NSAIDs - indomethacin (Indocin)  Muscle relaxants - cyclobenzaprine (Flexeril)  Topicals - none    LEGAL ISSUES  Are you currently involved in litigation related to your pain complaint? No  Have you ever been arrested or had other legal problems? Yes: marijuana  Have you filed a Workers' Compensation/SSI/MVA claim related to your pain complaint? No    --OMER/CareEverywhere signed for other providers,  Yes  --[place] Prescriber s Database reviewed:Yes    What are you hoping to do when your pain is more controlled? Exercise more, look for work ()           Opioid Evaluation tools     DIRE Score: Patient Selection for Opioid Analgesia      Diagnosis: 1    1 = Benign chronic condition with minimal objective findings or no definite medical diagnosis.  Examples: fibromyalgia, migraine headaches, non-specific back pain.  2 = Slowly progressive condition concordant with moderate pain, or fixed condition with  moderate objective findings. Examples: failed back surgery syndrome, back pain with  moderate degenerative changes, neuropathic pain.  3 = Advanced condition concordant with severe pain with objective findings. Examples:  severe ischemic vascular disease, advanced neuropathy, severe spinal stenosis.    Intractability: 1    1 = Few therapies have been tried and the patient takes a passive role in his/her pain  management process.  2 = Most customary treatments have been tried but the patient is not fully engaged in the pain  management process, or barriers prevent (insurance, transportation, medical illness).  3 = Patient fully engaged in a spectrum of appropriate treatments but with inadequate  response.    Risk (P+C+R+S): 8    Psychological: 1    1 = Serious personality dysfunction or mental illness interfering with care. Example: personality disorder, severe affective disorder, significant personality issues.  2 = Personality or mental health interferes moderately.  Example: depression or anxiety disorder.  3 = Good communication with clinic. No significant personality dysfunction or mental illness.    Chemical Health: 2    1 = Active or very recent use of illicit drugs, excessive alcohol, or prescription drug abuse.  2 = Chemical coper (uses medications to cope with stress) or history of CD in remission.  3 = No CD history. Not drug-focused or chemically reliant.    Reliability: 3  1 = History of numerous problems: medication misuse, missed appointments, rarely follows through.  2 = Occasional difficulties with compliance, but generally reliable.  3 = Highly reliable patient with meds, appointments & treatment.    Social Support: 2  1 = Life in chaos. Little family support and few close relationships. Loss of most normal life roles.  2 = Reduction in some relationships and life roles.  3 = Supportive family/close relationships. Involved in work or school and no social isolation.     Efficacy score: 2    1 = Poor function or minimal pain relief despite moderate to high doses.  2 = Moderate benefit with function improved in a number of ways (or insufficient info - hasn't tried opioid yet or very low doses or too short of a trial).  3 = Good improvement in pain and function and quality of life with stable doses over time.      Total score (D + I + R + E): 12    Score 7-13: Not a suitable candidate for long-term opioid analgesia  Score 14-21: May be a good candidate for long-term opioid analgesia  Used with permission by Juaquin Santiago M.D.  Date: Oct 31, 2019  Patient Name: David Juarez    OPIOID RISK TOOL        Helio each box that applies Item score if female Item score if male   1. Family history of substance abuse Alcohol  1 3    Illegal Drugs  2 3    Prescription Drugs  4 4   2. Personal History of Substance Abuse Alcohol  3 3    Illegal Drugs 3 4 4    Prescription Drugs  5 5   3. Age (Helio box if 16 - 45)   1 1   4. History of Preadolescent Sexual Abuse   3 0   5.  Psychological Disease ADD,  OCD,  Bipolar,  Schizophrenia 2 2 2    Depression 1 1 1     TOTAL:  6     Used by Permission: Laura Ramirez MD   DIRE Score:  No flowsheet data found.   Score 7-13: Not a suitable candidate for long-term opioid analgesia    Source: [facility]   2005    Opioid Risk Tool Score:  No flowsheet data found.   Total Score Risk Category  4 - 7 =  Moderate Risk   of future problems with Opioid     Functional Assessment  Questionnaire -5  No flowsheet data found.    Bedside opioid-overdose calculator (RIOSORD)      In the past six months, has your patient had an outpatient,          Points for  inpatient or ED visit for any of the following:           'yes' answer    Substance use disorder (addiction) of any kind,     25   including alcohol and cannabis?  Bipolar disorder or schizophrenia?       10  Stroke or other cerebrovascular disease?          9  Signifi cant chronic kidney disease?         8  Heart failure?           7  Nonmalignant pancreatic disease?         7  Chronic pulmonary disease?          5  Chronic headache?           5  Does your patient take:  Fentanyl (transdermal or transmucosal)?      13  Morphine?          11  Methadone?          10  Hydromorphone?           7  Extended-release or long-acting (ER/LA) opioid?          5  Benzodiazepine?           9  Antidepressant?           8   >100 mg MME/day?                      7  TOTAL   43    Average probability of overdose or serious opioid-induced respiratory depression in the next six months   Points    Risk   0-4     2%   5-7     5%   8-9     7%   10-17     15%   18-25     30%   26-41     55%   42     83%        Problem, Medication and Allergy Lists were reviewed and are current..    Patient is an established patient of this clinic..         Review of Systems:   CONSTITUTIONAL: no fatigue, no unexpected change in weight  SKIN: no worrisome rashes, no worrisome moles, no worrisome  lesions  EYES: no acute vision problems or changes  ENT: no ear problems, no mouth problems, no throat problems  RESP: no significant cough, no shortness of breath  CV: no chest pain, no palpitations, no new or worsening peripheral edema  GI: no nausea, no vomiting, no constipation, no diarrhea         Physical Exam:   There were no vitals filed for this visit.  There is no height or weight on file to calculate BMI.  Data Unavailable  Vitals were reviewed and were normal  Vitals were reviewed  GENERAL: healthy, alert, well nourished, well hydrated, no distress  HENT: ear canals- normal; TMs- normal; Nose- normal; Mouth- no ulcers, no lesions  NECK: no tenderness, no adenopathy, no asymmetry, no masses, no stiffness; thyroid- normal to palpation  RESP: lungs clear to auscultation - no rales, no rhonchi, no wheezes  CV: regular rates and rhythm, normal S1 S2, no S3 or S4 and no murmur, no click or rub -  ABDOMEN: Large midline scar,soft, no tenderness, no  hepatosplenomegaly, no masses, normal bowel sounds  MS: extremities- no gross deformities noted, no edema  NEURO: strength and tone- normal, sensory exam- grossly normal, mentation- intact, speech- normal, reflexes- symmetric  BACK: no CVA tenderness, no paralumbar tenderness, some paraspinous tenderness in the low thoracic area, rom restricted with flexion and extension.  Neurovascular status intact and symmetric in the lower extremities  PSYCH: Alert and oriented times 3; speech- coherent , normal rate and volume; able to articulate logical thoughts, able to abstract reason, no tangential thoughts, no hallucinations or delusions, affect- normal        Results:     Results for orders placed or performed in visit on 10/24/19   Chlamydia/Gono Amplified (DNA SEQ)   Result Value Ref Range    Chlamydia trac,Amplified Prb Negative Negative    N gonorrhoeae,Amplified Prb Negative Negative    Narrative    Test performed by:  [facility] [address] [place] [address]        -Comprehensive rapid urine drug screen obtained today: Yes    Assessment and Plan    David Juarez is here for evaluation of chronic pain.      ORT and DIRE reflect the need to see a pain specialist         The etiology of patient's chronic pain is mechanical low back pain   1. Mechanical low back pain  Continue tylenol and stretching  - PAIN MANAGEMENT REFERRAL  - tiZANidine (ZANAFLEX) 4 MG capsule; Take 1 capsule (4 mg) by mouth 3 times daily  Dispense: 30 capsule; Refill: 1  - order for DME; Equipment being ordered: heating pad  Dispense: 1 each; Refill: 1    Exercise discussed and patient  and declined to increase exercise at this time    I explained that the assessment process may take up to 3 visits.   Expectations for CPM were also copied into the patient instructions.    Goals of therapy:     Increase function     Decrease suffering     May not relieve pain  1) Non-medical management: stretching   2) Non-opioid, medical management: tylenol, tizanidine  3) Opioid medical management: defer to pain specialist  4) Behavioral Health referral completed for CPM BH evaluation.      6) Asked patient to follow-up in one month with same provider for   CPM Follow up (20min)visit after he's seen the pain clinic  7)Chronic pain syndrome added to the patient s problem list               Total of 30 minutes was spent in face to face contact with patient with > 50% in counseling and coordination of care.  Options for treatment and/or follow-up care were reviewed with the patient. David Juarez was engaged and actively involved in the decision making process. He verbalized understanding of the options discussed and was satisfied with the final plan.    Peyman James MD

## 2019-11-11 ENCOUNTER — TELEPHONE (OUTPATIENT)
Dept: FAMILY MEDICINE | Facility: CLINIC | Age: 49
End: 2019-11-11

## 2019-11-11 NOTE — LETTER
[address] [address]  [phone]        November 11, 2019    David Juarez  [address]  [facility] [address]          Dear David,    We were looking through you chart and noticed that you haven't been seen in a while, we would like to schedule an office visit for a Diabetes recheck when you have time. Please give the clinic a call at [phone] and schedule at your earliest convienence.  Thank you so much, and we look forward to seeing you soon.       I have sent with a PHQ-9 (depression screening questionnaire), if you could please fill it out and send back in the pre-paid envelope we have provided we would greatly appreciate it.  The questions in the PHQ-9 can sometimes be related to pain but please try to only answer the questions for depression and anxiety related items.  Thank you so much for your time we appreciate it and look forward to seeing you soon!        Sincerely,        Office of Dr. Pool

## 2019-11-11 NOTE — TELEPHONE ENCOUNTER
Panel Management Review      Patient has the following on his problem list:     Diabetes    ASA: Passed    Last A1C  Lab Results   Component Value Date    A1C 7.0 07/12/2019    A1C 5.9 10/09/2018    A1C 5.9 12/24/2012     A1C tested: FAILED    Last LDL:    Lab Results   Component Value Date    CHOL 237 07/12/2019     Lab Results   Component Value Date    HDL 83 07/12/2019     Lab Results   Component Value Date     07/12/2019     Lab Results   Component Value Date    TRIG 120 07/12/2019     Lab Results   Component Value Date    CHOLHDLRATIO 2.8 10/09/2018     Lab Results   Component Value Date    NHDL 154 07/12/2019       Is the patient on a Statin? YES             Is the patient on Aspirin? NO    Medications     HMG CoA Reductase Inhibitors     atorvastatin (LIPITOR) 40 MG tablet             Last three blood pressure readings:  BP Readings from Last 3 Encounters:   10/31/19 128/83   10/24/19 134/85   07/12/19 112/76       Date of last diabetes office visit: 9/19/2019     Tobacco History:     History   Smoking Status     Current Every Day Smoker     Packs/day: 0.10     Years: 5.00     Types: Cigarettes   Smokeless Tobacco     Never Used     Comment: pt is not ready to quit at this time.         Hypertension   Last three blood pressure readings:  BP Readings from Last 3 Encounters:   10/31/19 128/83   10/24/19 134/85   07/12/19 112/76     Blood pressure: Passed    HTN Guidelines:  Less than 140/90      Composite cancer screening  Chart review shows that this patient is due/due soon for the following None  Summary:    Patient is due/failing the following:   PHQ9    Action needed:   Patient needs office visit for diabetes recheck.    Type of outreach:    Sent letter.    Questions for provider review:    None                                                                                                                                    Aimee Best CMA      Chart routed to Care Team .

## 2020-01-24 ENCOUNTER — HOSPITAL (OUTPATIENT)
Dept: OTHER | Age: 50
End: 2020-01-24
Attending: EMERGENCY MEDICINE

## 2020-01-24 LAB
ANALYZER ANC (IANC): ABNORMAL
ANION GAP SERPL CALC-SCNC: 15 MMOL/L (ref 10–20)
BASOPHILS # BLD: 0.1 K/MCL (ref 0–0.3)
BASOPHILS NFR BLD: 1 %
BUN SERPL-MCNC: 11 MG/DL (ref 6–20)
BUN/CREAT SERPL: 17 (ref 7–25)
CALCIUM SERPL-MCNC: 8.9 MG/DL (ref 8.4–10.2)
CHLORIDE SERPL-SCNC: 100 MMOL/L (ref 98–107)
CO2 SERPL-SCNC: 25 MMOL/L (ref 21–32)
CREAT SERPL-MCNC: 0.65 MG/DL (ref 0.67–1.17)
DIFFERENTIAL METHOD BLD: ABNORMAL
EOSINOPHIL # BLD: 0.3 K/MCL (ref 0.1–0.5)
EOSINOPHIL NFR BLD: 3 %
ERYTHROCYTE [DISTWIDTH] IN BLOOD: 15.7 % (ref 11–15)
GLUCOSE SERPL-MCNC: 91 MG/DL (ref 65–99)
HCT VFR BLD CALC: 43 % (ref 39–51)
HGB BLD-MCNC: 14.6 G/DL (ref 13–17)
IMM GRANULOCYTES # BLD AUTO: 0 K/MCL (ref 0–0.2)
IMM GRANULOCYTES NFR BLD: 0 %
LYMPHOCYTES # BLD: 2.4 K/MCL (ref 1–4.8)
LYMPHOCYTES NFR BLD: 30 %
MCH RBC QN AUTO: 26.4 PG (ref 26–34)
MCHC RBC AUTO-ENTMCNC: 34 G/DL (ref 32–36.5)
MCV RBC AUTO: 77.9 FL (ref 78–100)
MONOCYTES # BLD: 0.8 K/MCL (ref 0.3–0.9)
MONOCYTES NFR BLD: 10 %
NEUTROPHILS # BLD: 4.3 K/MCL (ref 1.8–7.7)
NEUTROPHILS NFR BLD: 56 %
NEUTS SEG NFR BLD: ABNORMAL %
NRBC (NRBCRE): 0 /100 WBC
PLATELET # BLD: 277 K/MCL (ref 140–450)
POTASSIUM SERPL-SCNC: 4.1 MMOL/L (ref 3.4–5.1)
POTASSIUM SERPL-SCNC: ABNORMAL MMOL/L
RBC # BLD: 5.52 MIL/MCL (ref 4.5–5.9)
SODIUM SERPL-SCNC: 136 MMOL/L (ref 135–145)
WBC # BLD: 7.9 K/MCL (ref 4.2–11)

## 2020-01-24 PROCEDURE — 99283 EMERGENCY DEPT VISIT LOW MDM: CPT | Performed by: NURSE PRACTITIONER

## 2020-03-11 ENCOUNTER — TELEPHONE (OUTPATIENT)
Dept: FAMILY MEDICINE | Facility: CLINIC | Age: 50
End: 2020-03-11

## 2020-03-11 NOTE — TELEPHONE ENCOUNTER
Patient is due for a PHQ-9.  Index start date:11/20/2019  Index end date:3/19/2020    Please call patient.

## 2020-03-16 ENCOUNTER — TELEPHONE (OUTPATIENT)
Dept: FAMILY MEDICINE | Facility: CLINIC | Age: 50
End: 2020-03-16

## 2020-03-16 NOTE — TELEPHONE ENCOUNTER
[facility] phone call message- patient requesting a refill:    Full Medication Name:     lisinopril (PRINIVIL/ZESTRIL) 2.5 MG tablet lisinopril (PRINIVIL/ZESTRIL) 2.5 MG tablet   Take 4 tablets (10 mg) by mouth daily - Oral     metFORMIN (GLUCOPHAGE) 500 MG tablet   Take 1 tablet (500 mg) by mouth 2 times daily (with meals) - Oral     Pharmacy confirmed as   [facility] [place] [facility] [place], [address]  [address] [place] [address]  Phone: [phone] Fax: [phone]  : Yes    Additional Comments:    Amanda states Pt has been seeing Dr. James for years.     OK to leave a message on voice mail? Yes    Primary language: English      needed? No    Call taken on March 16, 2020 at 2:34 PM by Ashanti Guillory CMA

## 2020-03-16 NOTE — TELEPHONE ENCOUNTER
Patient no longer is at the [facility] and no other number listed.     Kaykay Bella MA 3/16/2020

## 2020-04-15 ENCOUNTER — TELEPHONE (OUTPATIENT)
Dept: FAMILY MEDICINE | Facility: CLINIC | Age: 50
End: 2020-04-15

## 2020-04-15 DIAGNOSIS — E78.5 HYPERLIPIDEMIA, UNSPECIFIED HYPERLIPIDEMIA TYPE: ICD-10-CM

## 2020-04-15 DIAGNOSIS — I10 BENIGN ESSENTIAL HYPERTENSION: ICD-10-CM

## 2020-04-15 NOTE — TELEPHONE ENCOUNTER
Medication list verified with [facility] agency.     Lisinopril and lipitor  Refill requested.     Amanda will call clinic 5/16/20 to schedule appointment.     Note routed to Dr.Martin Martha RODGERS

## 2020-04-15 NOTE — TELEPHONE ENCOUNTER
[facility] phone call message- medication clarification/question:    Full Medication Name: Would like to go over med list with nurse.   Dose: .    Question: verify meds      Pharmacy confirmed as      [id number] - [place], [place] - [address] [place] [address]  [id number] - [place], [place] - [facility] [address] [facility]: Yes    OK to leave a message on voice mail? Yes    Primary language: English      needed? No    Call taken on April 15, 2020 at 1:21 PM by Lauren Rivera

## 2020-04-16 RX ORDER — ATORVASTATIN CALCIUM 40 MG/1
40 TABLET, FILM COATED ORAL DAILY
Qty: 30 TABLET | Refills: 11 | Status: SHIPPED | OUTPATIENT
Start: 2020-04-16 | End: 2020-08-31

## 2020-04-16 RX ORDER — LISINOPRIL 2.5 MG/1
10 TABLET ORAL DAILY
Qty: 30 TABLET | Refills: 1 | Status: SHIPPED | OUTPATIENT
Start: 2020-04-16 | End: 2020-08-31

## 2020-04-30 ENCOUNTER — AMBULATORY - HEALTHEAST (OUTPATIENT)
Dept: BEHAVIORAL HEALTH | Facility: CLINIC | Age: 50
End: 2020-04-30

## 2020-06-26 ENCOUNTER — AMBULATORY - HEALTHEAST (OUTPATIENT)
Dept: BEHAVIORAL HEALTH | Facility: CLINIC | Age: 50
End: 2020-06-26

## 2020-07-21 ENCOUNTER — DOCUMENTATION ONLY (OUTPATIENT)
Dept: FAMILY MEDICINE | Facility: CLINIC | Age: 50
End: 2020-07-21

## 2020-07-21 NOTE — PROGRESS NOTES
Interprofessional Team Consultation Note     Requesting Provider: Dr. James    Consultants:  Behavioral Health: Dr. Clarke and Dr. Lagunas  Care Coordination: Jose  PharmD: N/A  Family Medicine Physicians: Dr. James and Dr. Gamino    IDENTIFYING DATA/REASON FOR REFERRAL:  David Juarez is 49 year old male who is cared for by Dr. James.? Dr. James is requesting consultation related to reaching out to patient for clinic appointment. ?Relevant clinical information obtained from requesting PCP, interprofessional team members noted above and review of the medical record.     Patient Active Problem List   Diagnosis     Alcohol abuse     MDD (major depressive disorder)     Hyperlipidemia     Benign essential hypertension     Paranoid schizophrenia (H)     Antisocial personality disorder (H)     Cocaine use disorder, severe, dependence (H)     H/O medication noncompliance     Insomnia, unspecified     Schizoaffective disorder (H)     Severe cannabis use disorder (H)     Type 2 diabetes mellitus without complication, without long-term current use of insulin (H)     Tobacco use disorder     Hx of suicide attempt 2018 - drug overdose     Current Outpatient Medications   Medication     atorvastatin (LIPITOR) 40 MG tablet     diphenhydrAMINE (BENADRYL) 25 MG capsule     lisinopril (ZESTRIL) 2.5 MG tablet     metFORMIN (GLUCOPHAGE) 500 MG tablet     order for DME     risperiDONE (RISPERDAL) 2 MG tablet     tiZANidine (ZANAFLEX) 4 MG tablet     traZODone (DESYREL) 50 MG tablet     No current facility-administered medications for this visit.        Topics Discussed:  Patient is well-known to Dr. James. Patient has severe mental illness including chronic paranoid schizophrenia and polysbustance use, as well as diabetes and hypertension. See previous ITM note on 5/3/2018. Patient has not seen Dr. James since February and he has had a string of recent ED hospitalizations. Review of chart seems to mention that  patient has been discharged from psychiatry and is homeless. He used to have a  in the past. Recent [facility] ED visit has a statement from RUDY saying patient is not taking his medications since he leaves them at people's places since he is homeless. Also mentions an ACT team with  Sammie.     Recommendations/Action Items:  1. SW has updated patient's care team. Will reach out to ACT team. SW will reach out to patient to schedule follow up with Dr. James.   2. Dr. James will discuss potential of assisted living facility with patient as service that would increase stability and ability to care for physical and mental health needs. Patient would first need to apply for a CADI waiver. RUDY can assist with this process.   3. Due to the chronicity of patient's suicidality, repeated PHQ-9s should be done to monitor mental health symptoms. This has been added to patient's snapshot.     Ritika Clarke, PhD     Disclaimer  The above treatment recommendations are based on consultation with the patient's primary care provider and a review of relevant information in EPIC.? I have not personally examined the patient.? All recommendations should be implemented with considerations of the patient's relevant prior history and current clinical status.  Please contact me with any questions about the care of this patient.

## 2020-07-22 NOTE — PROGRESS NOTES
I have reviewed and agree with the behavioral health fellow's summary and recommendations.  Enedina Lagunas, PhD., LP

## 2020-07-27 NOTE — PROGRESS NOTES
RUDY called the primary # of patient's account. That # is for the [facility]. David is not there and this # is outdated.     SW tried calling his , his mother, that # is not in service.     Review of recent admission does not have any phone #'s on the chart.     RUDY reached out to patient's , Sammie, at [facility]/ the ACT Team. Her information was found in a recent hospital admission note. Unable to connect with her but did leave a detailed VM, requesting a return call or that she pass this message along to Sagar.     Will f/u on this in 2 days.     MARKO Chaudhari

## 2020-07-29 NOTE — PROGRESS NOTES
This SW completed 2nd outreach to patient's , Sammie. Left a detailed VM and requested a return call to discuss.     Will not do any further f/u at this time due to not having accurate contact #'s on the chart for David. There is an address on file for Sagar, so this SW mailed a letter on this date.     MARKO Chaudhari

## 2020-07-29 NOTE — PROGRESS NOTES
This SW recieved a VM from Sammie, , indicating that 'Angel' is at an inpatient CD treatment facility in [place], [place]. She welcomed a return call to discuss further.     SW returned the call to her but was unable to connect. Did not leave a VM. Will try calling her again in 2 days.     MARKO Chaudhari

## 2020-07-31 NOTE — PROGRESS NOTES
SW attempted another call out to Sammie, . Unable to connect. Did not leave a VM.     MARKO Chaudhari

## 2020-08-31 ENCOUNTER — OFFICE VISIT (OUTPATIENT)
Dept: FAMILY MEDICINE | Facility: CLINIC | Age: 50
End: 2020-08-31
Payer: MEDICAID

## 2020-08-31 VITALS
BODY MASS INDEX: 39.85 KG/M2 | RESPIRATION RATE: 20 BRPM | HEART RATE: 65 BPM | OXYGEN SATURATION: 98 % | WEIGHT: 270.6 LBS | SYSTOLIC BLOOD PRESSURE: 120 MMHG | DIASTOLIC BLOOD PRESSURE: 78 MMHG | TEMPERATURE: 98.3 F

## 2020-08-31 DIAGNOSIS — M54.59 MECHANICAL LOW BACK PAIN: ICD-10-CM

## 2020-08-31 DIAGNOSIS — F20.0 PARANOID SCHIZOPHRENIA (H): ICD-10-CM

## 2020-08-31 DIAGNOSIS — Z00.01 ENCOUNTER FOR ROUTINE ADULT MEDICAL EXAM WITH ABNORMAL FINDINGS: ICD-10-CM

## 2020-08-31 DIAGNOSIS — I10 BENIGN ESSENTIAL HYPERTENSION: Primary | ICD-10-CM

## 2020-08-31 DIAGNOSIS — E11.9 TYPE 2 DIABETES MELLITUS WITHOUT COMPLICATION, WITHOUT LONG-TERM CURRENT USE OF INSULIN (H): ICD-10-CM

## 2020-08-31 DIAGNOSIS — E78.5 HYPERLIPIDEMIA, UNSPECIFIED HYPERLIPIDEMIA TYPE: ICD-10-CM

## 2020-08-31 LAB
ALBUMIN SERPL-MCNC: 4.3 MG/DL (ref 3.9–5.1)
ALP SERPL-CCNC: 72.8 U/L (ref 40–150)
ALT SERPL-CCNC: 20.9 U/L (ref 0–45)
AST SERPL-CCNC: 15.5 U/L (ref 0–55)
BILIRUB SERPL-MCNC: 0.3 MG/DL (ref 0.2–1.3)
BUN SERPL-MCNC: 8.7 MG/DL (ref 7–21)
CALCIUM SERPL-MCNC: 10 MG/DL (ref 8.5–10.1)
CHLORIDE SERPLBLD-SCNC: 99.4 MMOL/L (ref 98–110)
CHOLEST SERPL-MCNC: 260.5 MG/DL (ref 0–200)
CHOLEST/HDLC SERPL: 3.7 {RATIO} (ref 0–5)
CO2 SERPL-SCNC: 29.6 MMOL/L (ref 20–32)
CREAT SERPL-MCNC: 0.6 MG/DL (ref 0.7–1.3)
GFR SERPL CREATININE-BSD FRML MDRD: >90 ML/MIN/1.7 M2
GLUCOSE SERPL-MCNC: 84.3 MG'DL (ref 70–99)
HBA1C MFR BLD: 6.6 % (ref 4.1–5.7)
HDLC SERPL-MCNC: 69.9 MG/DL
LDLC SERPL CALC-MCNC: 163 MG/DL (ref 0–129)
POTASSIUM SERPL-SCNC: 4.3 MMOL/L (ref 3.2–4.6)
PROT SERPL-MCNC: 7.4 G/DL (ref 6.8–8.8)
SODIUM SERPL-SCNC: 135.4 MMOL/L (ref 132–142)
TRIGL SERPL-MCNC: 139.4 MG/DL (ref 0–150)
VLDL CHOLESTEROL: 27.9 MG/DL (ref 7–32)

## 2020-08-31 RX ORDER — TRAZODONE HYDROCHLORIDE 50 MG/1
50-100 TABLET, FILM COATED ORAL
Qty: 60 TABLET | Refills: 1 | Status: SHIPPED | OUTPATIENT
Start: 2020-08-31

## 2020-08-31 RX ORDER — RISPERIDONE 2 MG/1
2 TABLET ORAL 2 TIMES DAILY
Qty: 60 TABLET | Refills: 1 | Status: SHIPPED | OUTPATIENT
Start: 2020-08-31

## 2020-08-31 RX ORDER — ATORVASTATIN CALCIUM 40 MG/1
40 TABLET, FILM COATED ORAL DAILY
Qty: 30 TABLET | Refills: 11 | Status: SHIPPED | OUTPATIENT
Start: 2020-08-31 | End: 2020-09-01

## 2020-08-31 RX ORDER — LISINOPRIL 10 MG/1
10 TABLET ORAL DAILY
Qty: 90 TABLET | Refills: 3 | Status: SHIPPED | OUTPATIENT
Start: 2020-08-31 | End: 2020-09-01

## 2020-08-31 NOTE — PROGRESS NOTES
Male Physical Note      Concerns today: refill meds        ROS:                      CONSTITUTIONAL: no fatigue, no unexpected change in weight  SKIN: no worrisome rashes, no worrisome moles, no worrisome lesions  EYES: no acute vision problems or changes  ENT: no ear problems, no mouth problems, no throat problems  RESP: no significant cough, no shortness of breath  CV: no chest pain, no palpitations, no new or worsening peripheral edema  GI: no nausea, no vomiting, no constipation, no diarrhea  PSYCHIATRIC: Currently in inpatient treatment at [facility], feels like his mental health is pretty good right now, he is stressed about finding housing    Past Medical History:   Diagnosis Date     Depressive disorder      Diabetes (H)      Hyperlipidemia      Hypertension      Schizophrenia (H)      Suicide attempt by drug ingestion, initial encounter (H) 8/28/2017        Family History   Problem Relation Age of Onset     No Known Problems Mother      Hypertension Father      Unknown/Adopted Maternal Grandmother      Unknown/Adopted Maternal Grandfather      Unknown/Adopted Paternal Grandmother      Unknown/Adopted Paternal Grandfather      No Known Problems Son      Diabetes No family hx of      Coronary Artery Disease No family hx of      Other Cancer No family hx of      Reviewed no other significant FH           Family History and past Medical History reviewed and unchanged/updated.    Social History     Tobacco Use     Smoking status: Current Every Day Smoker     Packs/day: 0.10     Years: 5.00     Pack years: 0.50     Types: Cigarettes     Smokeless tobacco: Never Used     Tobacco comment: pt is not ready to quit at this time.   Substance Use Topics     Alcohol use: Yes     Comment: heavy daily drinker for years, last drink 6/25/19     Single  Children ? no    Has anyone hurt you physically, for example by pushing, hitting, slapping or kicking you or forcing you to have sex? Denies  Do you feel threatened or  controlled by a partner, ex-partner or anyone in your life? Denies    RISK BEHAVIORS AND HEALTHY HABITS:  Tobacco Use/Smoking: yes, 2ppd, not interested in quitting right now  Illicit Drug Use: None, h/o polysubstance abuse  ETOH: None  Do you use alcohol? Currently in treatment  Sexually Active: No  Diet (5-7 servings of fruits/veg daily): Yes   Exercise (30 min accumulated most days):No  Dental Care: needs referral, has bad dentition   Calcium 1500 mg/d:  Yes  Seat Belt Use: Yes     Cholesterol Level (>44 yo or at risk):  Recommended and patient accepted testing., ASA use (>3% risk in 5 y): declined/history of anaphylactic reaction to nsaids. and HIV screening:  Recommended and patient declined testing.        CV Risk based on Pooled Cohort Risk:pending       Immunization History   Administered Date(s) Administered     DTaP, Unspecified 12/06/2013     Flu, Unspecified 11/10/2006, 11/08/2011, 12/06/2013     Influenza (IIV3) PF 11/10/2006, 11/08/2011, 12/06/2013     Mantoux Tuberculin Skin Test 09/26/2017, 03/29/2018, 04/12/2018, 08/30/2018     Pneumococcal 23 valent 02/19/2016     TD (ADULT, 7+) 11/10/2006, 12/20/2017     TDAP Vaccine (Boostrix) 12/06/2013     Td (Adult), Adsorbed 11/10/2006     Reviewed Immunization Record Today    EXAMINATION:  /78   Pulse 65   Temp 98.3  F (36.8  C) (Oral)   Resp 20   Wt 122.7 kg (270 lb 9.6 oz)   SpO2 98%   BMI 39.85 kg/m    GENERAL: healthy, alert and no distress  EYES: Eyes grossly normal to inspection, extraocular movements - intact, and PERRL  HENT: ear canals- normal; TMs- normal; Nose- normal; Mouth- no ulcers, poor dentition with scattered caries  NECK: no tenderness, no adenopathy, no asymmetry, no masses, no stiffness; thyroid- normal to palpation  RESP: lungs clear to auscultation - no rales, no rhonchi, no wheezes  BREAST: no masses, no tenderness, no nipple discharge, no palpable axillary masses or adenopathy  CV: regular rates and rhythm, normal S1 S2,  no S3 or S4 and no murmur, no click or rub -  ABDOMEN: soft, no tenderness, no  hepatosplenomegaly, no masses, normal bowel sounds, large midline scar  MS: extremities- no gross deformities noted, no edema  SKIN: no suspicious lesions, no rashes  NEURO: strength and tone- normal, sensory exam- grossly normal, mentation- intact, speech- normal, reflexes- symmetric  BACK: no CVA tenderness, no paralumbar tenderness  PSYCH: Alert and oriented times 3; speech- coherent , normal rate and volume; able to articulate logical thoughts, able to abstract reason, no tangential thoughts, no hallucinations or delusions, affect- normal  LYMPHATICS: ant. cervical- normal, post. cervical- normal, axillary- normal, supraclavicular- normal, inguinal- normal    ASSESSMENT:  1. DM-2  2. Paranoid Schizophrenia  3. Polysubstance abuse  4. HTN  5. Mechanical low back pain  6. Hyperlipidemia  7. Poor dentition    PLAN:  1.diabetic/htn bloodwork and urine microalbumin, discusssed weight loss and strongly encourages smoking cessation  2. Refilled all meds  3. Referral to dentist-handout given  4. Encouraged ongoing engagement with  and ACT team to help with housing and ongoing MI/CD cares.

## 2020-08-31 NOTE — ADDENDUM NOTE
Addended by: YARIEL JUNIOR on: 8/31/2020 11:02 AM     Modules accepted: Orders    
EOAE (evoked otoacoustic emission)

## 2020-08-31 NOTE — LETTER
September 15, 2020      David Juarez  [address] [place] [id number]        Dear ,    We are writing to inform you of your test results.    Your labwork returned with the following results, everything looks good except elevated cholesterol.   I recommend the following: I'm going to send a new prescription for a higher dose of atorvastatin.  We should recheck in 3 months.     Resulted Orders   Hemoglobin A1c ([facility])   Result Value Ref Range    Hemoglobin A1C 6.6 (H) 4.1 - 5.7 %   Comprehensive Metabolic Panel ([facility])   Result Value Ref Range    Albumin 4.3 3.9 - 5.1 mg/dL    Alkaline Phosphatase 72.8 40.0 - 150.0 U/L    ALT 20.9 0.0 - 45.0 U/L    AST 15.5 0.0 - 55.0 U/L    Bilirubin Total 0.3 0.2 - 1.3 mg/dL    Urea Nitrogen 8.7 7.0 - 21.0 mg/dL    Calcium 10.0 8.5 - 10.1 mg/dL    Chloride 99.4 98.0 - 110.0 mmol/L    Carbon Dioxide 29.6 20.0 - 32.0 mmol/L    Creatinine 0.6 (L) 0.7 - 1.3 mg/dL    Glucose 84.3 70.0 - 99.0 mg'dL    Potassium 4.3 3.2 - 4.6 mmol/L    Sodium 135.4 132.0 - 142.0 mmol/L    Protein Total 7.4 6.8 - 8.8 g/dL    GFR Estimate >90 >60.0 mL/min/1.7 m2    GFR Estimate If Black >90 >60.0 mL/min/1.7 m2   Lipid Panel ([facility])   Result Value Ref Range    Cholesterol 260.5 (H) 0.0 - 200.0 mg/dL    Cholesterol/HDL Ratio 3.7 0.0 - 5.0    HDL Cholesterol 69.9 >40.0 mg/dL    LDL Cholesterol Calculated 163 (H) 0 - 129 mg/dL    Triglycerides 139.4 0.0 - 150.0 mg/dL    VLDL Cholesterol 27.9 7.0 - 32.0 mg/dL       If you have any questions or concerns, please call the clinic at the number listed above.       Sincerely,        Peyman James MD

## 2020-09-01 ENCOUNTER — TELEPHONE (OUTPATIENT)
Dept: FAMILY MEDICINE | Facility: CLINIC | Age: 50
End: 2020-09-01

## 2020-09-01 DIAGNOSIS — M54.59 MECHANICAL LOW BACK PAIN: ICD-10-CM

## 2020-09-01 DIAGNOSIS — E11.9 TYPE 2 DIABETES MELLITUS WITHOUT COMPLICATION, WITHOUT LONG-TERM CURRENT USE OF INSULIN (H): ICD-10-CM

## 2020-09-01 DIAGNOSIS — F10.10 ALCOHOL ABUSE: Primary | ICD-10-CM

## 2020-09-01 DIAGNOSIS — I10 BENIGN ESSENTIAL HYPERTENSION: ICD-10-CM

## 2020-09-01 DIAGNOSIS — E78.5 HYPERLIPIDEMIA, UNSPECIFIED HYPERLIPIDEMIA TYPE: ICD-10-CM

## 2020-09-01 RX ORDER — ATORVASTATIN CALCIUM 40 MG/1
40 TABLET, FILM COATED ORAL DAILY
Qty: 30 TABLET | Refills: 11 | Status: SHIPPED | OUTPATIENT
Start: 2020-09-01 | End: 2020-09-15

## 2020-09-01 RX ORDER — LISINOPRIL 20 MG/1
20 TABLET ORAL DAILY
Qty: 90 TABLET | Refills: 1 | Status: SHIPPED | OUTPATIENT
Start: 2020-09-01 | End: 2022-05-19

## 2020-09-01 RX ORDER — GABAPENTIN 100 MG/1
100 CAPSULE ORAL 3 TIMES DAILY
Qty: 90 CAPSULE | Refills: 0 | Status: SHIPPED | OUTPATIENT
Start: 2020-09-01 | End: 2022-06-06

## 2020-09-01 RX ORDER — METOPROLOL TARTRATE 25 MG/1
25 TABLET, FILM COATED ORAL 2 TIMES DAILY
Qty: 60 TABLET | Refills: 0 | Status: SHIPPED | OUTPATIENT
Start: 2020-09-01 | End: 2022-06-06

## 2020-09-01 NOTE — TELEPHONE ENCOUNTER
Per Allyson, the med changes told to her that require Rxs sent to [facility] in [place] are:    Send Rxs for Atorvastatin, metformin, tizanidine (no dose changes)  Send Rx for lisinopril for 20mg daily (dose change from hospital)  Metoprolol 25mg BID (from treatment center)  Gabapentin 100mg TID    Per Dr. Jacob Valadez OK'ed these. I e-prescribed all requested Rxs.    Rissa Schroeder, Pharm.D.

## 2020-09-01 NOTE — TELEPHONE ENCOUNTER
[facility] phone call message- medication clarification/question:    Full Medication Name:       Nurse wants a call back to go over medications from pt's visit yesterday.     OK to leave a message on voice mail? Yes    Primary language: English      needed? No    Call taken on September 1, 2020 at 10:56 AM by Ashanti Guillory CMA

## 2020-09-15 DIAGNOSIS — E78.5 HYPERLIPIDEMIA, UNSPECIFIED HYPERLIPIDEMIA TYPE: Primary | ICD-10-CM

## 2020-09-15 RX ORDER — ATORVASTATIN CALCIUM 80 MG/1
80 TABLET, FILM COATED ORAL DAILY
Qty: 90 TABLET | Refills: 3 | Status: SHIPPED | OUTPATIENT
Start: 2020-09-15 | End: 2022-05-19

## 2020-12-23 ENCOUNTER — TELEPHONE (OUTPATIENT)
Dept: FAMILY MEDICINE | Facility: CLINIC | Age: 50
End: 2020-12-23

## 2020-12-23 NOTE — TELEPHONE ENCOUNTER
[facility] phone call message- general phone call:    Reason for call:    Needing orders for pt. Pt is currently at [facility] in [place]    Action desired:     Please call back to discuss.     Return call needed: Yes    OK to leave a message on voice mail? Yes    Advised patient to response may take up to 2 business days: Yes    Primary language: English      needed? No    Call taken on December 23, 2020 at 1:48 PM by Ashanti Guillory CMA

## 2020-12-23 NOTE — TELEPHONE ENCOUNTER
Patient is at a 30 day treatment program and scripts for following meds sent to [facility] pharmacy are needed if appropriate:    Atorvastatin 80 mg daily    Gabapentin 100mg tid    Lisinopril 20mg daily    Metformin 500mg bid w/meals    Amlodipine 10mg daily (not currently on our med list)    Metoprolol  25mg bid  Remainder of meds will be managed by psych.    [facility] pharmacy  [phone] p  [phone] f    Note routed to Dr.Martin Thomas RN

## 2021-01-05 ENCOUNTER — TRANSFERRED RECORDS (OUTPATIENT)
Dept: HEALTH INFORMATION MANAGEMENT | Facility: CLINIC | Age: 51
End: 2021-01-05

## 2021-01-07 ENCOUNTER — TRANSFERRED RECORDS (OUTPATIENT)
Dept: HEALTH INFORMATION MANAGEMENT | Facility: CLINIC | Age: 51
End: 2021-01-07

## 2021-02-17 ENCOUNTER — TELEPHONE (OUTPATIENT)
Dept: FAMILY MEDICINE | Facility: CLINIC | Age: 51
End: 2021-02-17

## 2021-02-17 NOTE — TELEPHONE ENCOUNTER
[facility] phone call message- general phone call:    Reason for call: Pt was a wall for awhile and is at [facility]. If need to update any medications give Amanda a call.    Return call needed: No    OK to leave a message on voice mail? No    Primary language: English      needed? No    Call taken on February 17, 2021 at 1:31 PM by Grisel Flores-Cardona

## 2021-05-29 ENCOUNTER — RECORDS - HEALTHEAST (OUTPATIENT)
Dept: ADMINISTRATIVE | Facility: CLINIC | Age: 51
End: 2021-05-29

## 2021-05-30 ENCOUNTER — RECORDS - HEALTHEAST (OUTPATIENT)
Dept: ADMINISTRATIVE | Facility: CLINIC | Age: 51
End: 2021-05-30

## 2021-06-07 NOTE — PROGRESS NOTES
S: 50 y/o male in the [facility] ED presenting with suicidal ideation and hallucinations.    B: Hx paranoid schizophrenia, polysubstance abuse, antisocial personality d/o,  MDD, anxiety, type 2 diabetes.  Pt reporting auditory command hallucinations telling him to hurt himself by cutting his wrists and to hurt others.  Pt has been off of his medications for about one month and endorsed using cocaine two days ago.  Pt is currently homeless.  Last admission to [facility] in May 2019.  Pt is reporting he is unable to contract for safety if discharged and is cooperative at this time.    A: Voluntary  No medical concerns.  Pt denied any COVID-related sx.  Drug screen positive for THC and cocaine    R: 0221 attempted to reach On-Call for 4500.  No answer.    0251 attempted to reach On-Call for 5500.  No answer.    0310 On-Call for 5500 paged.  Awaiting callback.    0314 Dr. Thompson accepts for 4500/[facility].  Placed in queue at 0317.  ED Charge notified at 0318.  Unit notified at 0320.

## 2021-06-09 NOTE — PROGRESS NOTES
S: Bridgette, [facility] ED, 49/M, psychosis     B: Pt came in for evaluation of symptoms and seeking admission   SI w many ideas, no plan or intent  AH command to kill himself and others   VH   Pt not sleeping well, off his meds, poor appetite, low energy  Last admit about 2 months ago on 4500  Pt reports he has an ACT team in place, but doesn't report who     Medically cleared, eating, drinking, ambulating independently   No covid symptoms, swab in process  utox positive for cocaine     A: Voluntary     R: 45[facility]    1104pm - [facility] accepts   Pt placed in que   1108pm - unit charge in report. To give intake a call back when available

## 2022-01-01 ENCOUNTER — DOCUMENTATION ONLY (OUTPATIENT)
Dept: FAMILY MEDICINE | Facility: CLINIC | Age: 52
End: 2022-01-01

## 2022-01-01 ENCOUNTER — TELEPHONE (OUTPATIENT)
Dept: FAMILY MEDICINE | Facility: CLINIC | Age: 52
End: 2022-01-01

## 2022-01-01 ENCOUNTER — OFFICE VISIT (OUTPATIENT)
Dept: FAMILY MEDICINE | Facility: CLINIC | Age: 52
End: 2022-01-01
Payer: MEDICAID

## 2022-01-01 VITALS
BODY MASS INDEX: 32.65 KG/M2 | HEART RATE: 100 BPM | TEMPERATURE: 98.6 F | OXYGEN SATURATION: 99 % | SYSTOLIC BLOOD PRESSURE: 133 MMHG | DIASTOLIC BLOOD PRESSURE: 91 MMHG | HEIGHT: 67 IN | WEIGHT: 208 LBS | RESPIRATION RATE: 16 BRPM

## 2022-01-01 DIAGNOSIS — G63 POLYNEUROPATHY ASSOCIATED WITH UNDERLYING DISEASE (H): ICD-10-CM

## 2022-01-01 DIAGNOSIS — F20.0 PARANOID SCHIZOPHRENIA (H): ICD-10-CM

## 2022-01-01 DIAGNOSIS — G63 POLYNEUROPATHY ASSOCIATED WITH UNDERLYING DISEASE (H): Primary | ICD-10-CM

## 2022-01-01 DIAGNOSIS — E11.9 TYPE 2 DIABETES MELLITUS WITHOUT COMPLICATION, WITHOUT LONG-TERM CURRENT USE OF INSULIN (H): Primary | ICD-10-CM

## 2022-01-01 DIAGNOSIS — Z59.00 HOMELESSNESS: ICD-10-CM

## 2022-01-01 DIAGNOSIS — I10 BENIGN ESSENTIAL HYPERTENSION: ICD-10-CM

## 2022-01-01 LAB
ANION GAP SERPL CALCULATED.3IONS-SCNC: 8 MMOL/L (ref 7–15)
BUN SERPL-MCNC: 3.2 MG/DL (ref 6–20)
CALCIUM SERPL-MCNC: 9.5 MG/DL (ref 8.6–10)
CHLORIDE SERPL-SCNC: 103 MMOL/L (ref 98–107)
CHOLEST SERPL-MCNC: 190 MG/DL
CREAT SERPL-MCNC: 0.7 MG/DL (ref 0.67–1.17)
DEPRECATED HCO3 PLAS-SCNC: 28 MMOL/L (ref 22–29)
GFR SERPL CREATININE-BSD FRML MDRD: >90 ML/MIN/1.73M2
GLUCOSE SERPL-MCNC: 92 MG/DL (ref 70–99)
HBA1C MFR BLD: 5.5 % (ref 0–5.6)
HDLC SERPL-MCNC: 107 MG/DL
LDLC SERPL CALC-MCNC: 74 MG/DL
NONHDLC SERPL-MCNC: 83 MG/DL
POTASSIUM SERPL-SCNC: 4.3 MMOL/L (ref 3.4–5.3)
SODIUM SERPL-SCNC: 139 MMOL/L (ref 136–145)
TRIGL SERPL-MCNC: 44 MG/DL

## 2022-01-01 PROCEDURE — 80048 BASIC METABOLIC PNL TOTAL CA: CPT | Performed by: FAMILY MEDICINE

## 2022-01-01 PROCEDURE — 83036 HEMOGLOBIN GLYCOSYLATED A1C: CPT | Performed by: FAMILY MEDICINE

## 2022-01-01 PROCEDURE — 99214 OFFICE O/P EST MOD 30 MIN: CPT | Performed by: FAMILY MEDICINE

## 2022-01-01 PROCEDURE — 36415 COLL VENOUS BLD VENIPUNCTURE: CPT | Performed by: FAMILY MEDICINE

## 2022-01-01 PROCEDURE — 80061 LIPID PANEL: CPT | Performed by: FAMILY MEDICINE

## 2022-01-01 RX ORDER — GABAPENTIN 100 MG/1
100 CAPSULE ORAL 3 TIMES DAILY
Qty: 90 CAPSULE | Refills: 0 | Status: SHIPPED | OUTPATIENT
Start: 2022-01-01 | End: 2023-01-01

## 2022-01-01 RX ORDER — ATORVASTATIN CALCIUM 80 MG/1
80 TABLET, FILM COATED ORAL DAILY
Qty: 90 TABLET | Refills: 3 | Status: SHIPPED | OUTPATIENT
Start: 2022-01-01

## 2022-01-01 RX ORDER — GABAPENTIN 100 MG/1
100 CAPSULE ORAL 3 TIMES DAILY
Qty: 90 CAPSULE | Refills: 0 | Status: SHIPPED | OUTPATIENT
Start: 2022-01-01 | End: 2022-01-01

## 2022-01-01 RX ORDER — ATORVASTATIN CALCIUM 80 MG/1
80 TABLET, FILM COATED ORAL DAILY
Qty: 90 TABLET | Refills: 3 | Status: SHIPPED | OUTPATIENT
Start: 2022-01-01 | End: 2022-01-01

## 2022-01-01 RX ORDER — LISINOPRIL 10 MG/1
10 TABLET ORAL DAILY
Qty: 30 TABLET | Refills: 1 | Status: SHIPPED | OUTPATIENT
Start: 2022-01-01 | End: 2023-01-01

## 2022-01-01 SDOH — ECONOMIC STABILITY - HOUSING INSECURITY: HOMELESSNESS UNSPECIFIED: Z59.00

## 2022-01-01 ASSESSMENT — ENCOUNTER SYMPTOMS
ABDOMINAL PAIN: 0
HEMATURIA: 0
CONSTIPATION: 0
COUGH: 0
CHILLS: 0
HEMATOCHEZIA: 0

## 2022-01-01 ASSESSMENT — PATIENT HEALTH QUESTIONNAIRE - PHQ9
SUM OF ALL RESPONSES TO PHQ QUESTIONS 1-9: 12
10. IF YOU CHECKED OFF ANY PROBLEMS, HOW DIFFICULT HAVE THESE PROBLEMS MADE IT FOR YOU TO DO YOUR WORK, TAKE CARE OF THINGS AT HOME, OR GET ALONG WITH OTHER PEOPLE: SOMEWHAT DIFFICULT
SUM OF ALL RESPONSES TO PHQ QUESTIONS 1-9: 12

## 2022-05-13 ENCOUNTER — TELEPHONE (OUTPATIENT)
Dept: FAMILY MEDICINE | Facility: CLINIC | Age: 52
End: 2022-05-13
Payer: MEDICAID

## 2022-05-13 DIAGNOSIS — I10 BENIGN ESSENTIAL HYPERTENSION: ICD-10-CM

## 2022-05-13 DIAGNOSIS — E11.9 TYPE 2 DIABETES MELLITUS WITHOUT COMPLICATION, WITHOUT LONG-TERM CURRENT USE OF INSULIN (H): ICD-10-CM

## 2022-05-13 DIAGNOSIS — E78.5 HYPERLIPIDEMIA, UNSPECIFIED HYPERLIPIDEMIA TYPE: ICD-10-CM

## 2022-05-13 NOTE — TELEPHONE ENCOUNTER
[facility] phone call message- general phone call:    Reason for call: Amanda from [facility] called to get verbal orders to give the pt the following medications : atorvastatin (LIPITOR) 80 MG tablet, metFORMIN (GLUCOPHAGE) 500 , lisinopril (ZESTRIL) 20 MG tablet.    Return call needed: Yes    OK to leave a message on voice mail? Yes    Primary language: English      needed? No    Call taken on May 13, 2022 at 4:54 PM by Darcie Campbell

## 2022-05-16 NOTE — TELEPHONE ENCOUNTER
Patient last visit was 08/31/2020. Please address message below as needing. Thanks. NIKUNJ Nicolas

## 2022-05-19 RX ORDER — ATORVASTATIN CALCIUM 80 MG/1
80 TABLET, FILM COATED ORAL DAILY
Qty: 90 TABLET | Refills: 3 | Status: SHIPPED | OUTPATIENT
Start: 2022-05-19 | End: 2022-06-06

## 2022-05-19 RX ORDER — LISINOPRIL 20 MG/1
20 TABLET ORAL DAILY
Qty: 90 TABLET | Refills: 1 | Status: SHIPPED | OUTPATIENT
Start: 2022-05-19 | End: 2022-06-06

## 2022-06-06 ENCOUNTER — OFFICE VISIT (OUTPATIENT)
Dept: FAMILY MEDICINE | Facility: CLINIC | Age: 52
End: 2022-06-06
Payer: MEDICAID

## 2022-06-06 VITALS
WEIGHT: 217.2 LBS | TEMPERATURE: 98 F | SYSTOLIC BLOOD PRESSURE: 144 MMHG | DIASTOLIC BLOOD PRESSURE: 65 MMHG | HEART RATE: 82 BPM | HEIGHT: 68 IN | OXYGEN SATURATION: 99 % | RESPIRATION RATE: 20 BRPM | BODY MASS INDEX: 32.92 KG/M2

## 2022-06-06 DIAGNOSIS — F20.0 PARANOID SCHIZOPHRENIA (H): ICD-10-CM

## 2022-06-06 DIAGNOSIS — E78.5 HYPERLIPIDEMIA, UNSPECIFIED HYPERLIPIDEMIA TYPE: ICD-10-CM

## 2022-06-06 DIAGNOSIS — E11.9 TYPE 2 DIABETES MELLITUS WITHOUT COMPLICATION, WITHOUT LONG-TERM CURRENT USE OF INSULIN (H): Primary | ICD-10-CM

## 2022-06-06 DIAGNOSIS — F19.10 POLYSUBSTANCE ABUSE (H): ICD-10-CM

## 2022-06-06 DIAGNOSIS — F10.10 ALCOHOL ABUSE: ICD-10-CM

## 2022-06-06 DIAGNOSIS — G63 POLYNEUROPATHY ASSOCIATED WITH UNDERLYING DISEASE (H): ICD-10-CM

## 2022-06-06 DIAGNOSIS — Z71.85 VACCINE COUNSELING: ICD-10-CM

## 2022-06-06 DIAGNOSIS — I10 BENIGN ESSENTIAL HYPERTENSION: ICD-10-CM

## 2022-06-06 PROBLEM — D50.9 IRON DEFICIENCY ANEMIA, UNSPECIFIED IRON DEFICIENCY ANEMIA TYPE: Status: ACTIVE | Noted: 2022-06-06

## 2022-06-06 PROBLEM — F10.20 ALCOHOL USE DISORDER, MODERATE, DEPENDENCE (H): Status: ACTIVE | Noted: 2022-06-06

## 2022-06-06 PROBLEM — Z86.59 HISTORY OF SCHIZOPHRENIA: Status: ACTIVE | Noted: 2020-04-30

## 2022-06-06 PROCEDURE — 99214 OFFICE O/P EST MOD 30 MIN: CPT | Mod: GC | Performed by: STUDENT IN AN ORGANIZED HEALTH CARE EDUCATION/TRAINING PROGRAM

## 2022-06-06 RX ORDER — ATORVASTATIN CALCIUM 80 MG/1
80 TABLET, FILM COATED ORAL DAILY
Qty: 90 TABLET | Refills: 3 | Status: SHIPPED | OUTPATIENT
Start: 2022-06-06 | End: 2022-01-01

## 2022-06-06 RX ORDER — LISINOPRIL 10 MG/1
10 TABLET ORAL DAILY
Qty: 30 TABLET | Refills: 1 | Status: SHIPPED | OUTPATIENT
Start: 2022-06-06 | End: 2022-01-01

## 2022-06-06 RX ORDER — GABAPENTIN 100 MG/1
100 CAPSULE ORAL 3 TIMES DAILY
Qty: 90 CAPSULE | Refills: 0 | Status: SHIPPED | OUTPATIENT
Start: 2022-06-06 | End: 2022-01-01

## 2022-06-06 NOTE — PATIENT INSTRUCTIONS
Plan for the day:    -  Start Lisinopril 10 mg daily for high blood pressure  - Take Atorvastatin 80 mg every night  - Take metformin 500 mg twice daily  - Gabapentin TID for burning feet     Appointment with Dr. James on 6/20 at 10:40 AM

## 2022-06-06 NOTE — PROGRESS NOTES
Preceptor Attestation:  I discussed the patient with the resident and evaluated the patient in person. I have verified the content of the note, which accurately reflects my assessment of the patient and the plan of care.  Supervising Physician:  Megha Nelson MD.

## 2022-06-06 NOTE — PROGRESS NOTES
"  Assessment & Plan     Type 2 diabetes mellitus without complication, without long-term current use of insulin (H)  Per chart review has history of type 2 diabetes, last A1c was 6.2 1-year ago.  Has previously been on metformin twice daily but ran out of this medication.  He is interested in refills of his metformin and blood pressure meds today.  Renal function has been normal limits tenderness to ER visit. I did encourage him to have labs drawn today, however he would like to wait until he sees his PCP in 2 weeks to discuss labs further. Will refill metformin in interim.  - metFORMIN (GLUCOPHAGE) 500 MG tablet  Dispense: 60 tablet; Refill: 11    Hyperlipidemia, unspecified hyperlipidemia type  - atorvastatin (LIPITOR) 80 MG tablet  Dispense: 90 tablet; Refill: 3    Benign essential hypertension  - lisinopril (ZESTRIL) 10 MG tablet  Dispense: 30 tablet; Refill: 1    Paranoid schizophrenia (H)  Continue on trazodone and risperidone, this is not managed by our clinic and we will need OMER from facility.     Polyneuropathy associated with underlying disease (H)  - gabapentin (NEURONTIN) 100 MG capsule  Dispense: 90 capsule; Refill: 0    Vaccine counseling  Interested in COVID-19 booster, however had positive PCR in the ER on the 30th of May so will defer until further from active infection state. Never had symptoms of COVID-19 and did not know about this diagnosis until our visit today.    Alcohol abuse  Polysubstance abuse (H)  Currently in IP treatment. Congratulated him on sobriety efforts.      Tobacco Cessation:   reports that he has been smoking cigarettes. He has a 0.50 pack-year smoking history. He has never used smokeless tobacco.  Discussed cessation.    BMI:   Estimated body mass index is 33.42 kg/m  as calculated from the following:    Height as of this encounter: 1.717 m (5' 7.6\").    Weight as of this encounter: 98.5 kg (217 lb 3.2 oz).   Weight management plan: Patient was referred to their PCP to " discuss a diet and exercise plan.    Patient Instructions   Plan for the day:    -  Start Lisinopril 10 mg daily for high blood pressure  - Take Atorvastatin 80 mg every night  - Take metformin 500 mg twice daily  - Gabapentin TID for burning feet     Appointment with Dr. James on 6/20 at 10:40 AM      Julia Rivera MD  [facility]    Seen and discussed with Dr. eMgha Nelson    Adryan Hernandez is a 51 year old who presents for the following health issues:    HPI     Chief Complaints and History of Present Illnesses   Patient presents with     Other     Med check, refill med bp and dm     Imm/Inj     COVID-19 VACCINE     Presents to clinic today for medication refills.  He has a history of essential hypertension, hyperlipidemia, type 2 diabetes along with polysubstance use disorder and paranoid schizophrenia.  He reports improvement in his mood, he is currently in an inpatient treatment facility and is having his meds managed by psychiatric team.  He had numerous hospitalizations this spring which prompted the inpatient treatment.  His  was present at the beginning of his visit, unfortunately had to leave prior to obtaining history.    He reports that things are going well at his treatment facility.  He is staying active with both therapies and activities around the facility.  He has not been taking his blood pressure or diabetes medications.  He would like to start back up on the medicines he was on prior to today.  He does report numbness and burning in his feet intermittently.  No rashes or skin lesions.  He has had no recent fevers or chills.  Incidentally diagnosed with COVID-19 on May 30 which he did not know about until the visit today.    Review of Systems   Constitutional, HEENT, cardiovascular, pulmonary, gi and gu systems are negative, except as otherwise noted.      Objective    BP (!) 144/65   Pulse 82   Temp 98  F (36.7  C) (Oral)   Resp 20    "Ht 1.717 m (5' 7.6\")   Wt 98.5 kg (217 lb 3.2 oz)   SpO2 99%   BMI 33.42 kg/m    Body mass index is 33.42 kg/m .     Physical Exam   GENERAL: healthy, alert and no distress  EYES: Eyes grossly normal to inspection, PERRL and conjunctivae and sclerae normal  HENT: ear canals and TM's normal, nose and mouth without ulcers or lesions  NECK: no adenopathy, no asymmetry, masses, or scars and thyroid normal to palpation  RESP: lungs clear to auscultation - no rales, rhonchi or wheezes  CV: regular rate and rhythm, normal S1 S2, no S3 or S4, no murmur, click or rub, no peripheral edema and peripheral pulses strong  ABDOMEN: soft, nontender, no hepatosplenomegaly, no masses and bowel sounds normal  MS: no gross musculoskeletal defects noted, no edema  SKIN: no suspicious lesions or rashes  NEURO: Normal strength and tone, mentation intact and speech normal                "

## 2022-08-26 NOTE — PROGRESS NOTES
To be completed in Nursing note:    Please reference list for forms that require a visit for completion.  Please remind patients that providers are given 3-5 business days to complete and return forms.      Form type: [employer] / [employer] for disability inclusion and/or brain Injury waiver     Date form received: 22    Date form completed by Physician: 22    How was form returned to patient (mailed, faxed, or at  for patient to ): faxed and mailed it to [employer] [address] [place] [address], [address] [place] [address]   Phone [phone] fax: [phone]    Date form mailed/faxed/left at  for patient and sent to HIM for [email or web address]      Once form is left for patient, faxed, or mailed PCS will then close the documentation only encounter.

## 2022-09-01 NOTE — PROGRESS NOTES
To be completed in Nursing note:    Please reference list for forms that require a visit for completion.  Please remind patients that providers are given 3-5 business days to complete and return forms.      Form type: [employer] [place] / [employer] for disability inclusion and/or brain Injury waiver   ICD-10 Diagnosis/Medication List Clinician Verification Form     Date form received:     Date form completed by Physician: 22    How was form returned to patient (mailed, faxed, or at  for patient to ): faxed to  [phone]    Date form mailed/faxed/left at  for patient and sent to HIM for [email or web address]    Put it in medical record to scan it in patient chart 22      Once form is left for patient, faxed, or mailed PCS will then close the documentation only encounter.

## 2022-09-08 NOTE — LETTER
September 9, 2022      David Juarez  [address]  [facility] [place] [address]        Dear ,    We are writing to inform you of your test results.    Your labwork returned with the following results, normal blood sugar, kidney function, and cholesterol.   I recommend the following: follow up with me in one month.       Resulted Orders   BASIC METABOLIC PANEL   Result Value Ref Range    Creatinine 0.70 0.67 - 1.17 mg/dL    Sodium 139 136 - 145 mmol/L    Potassium 4.3 3.4 - 5.3 mmol/L    Urea Nitrogen 3.2 (L) 6.0 - 20.0 mg/dL    Chloride 103 98 - 107 mmol/L    Carbon Dioxide (CO2) 28 22 - 29 mmol/L    Anion Gap 8 7 - 15 mmol/L    Glucose 92 70 - 99 mg/dL    GFR Estimate >90 >60 mL/min/1.73m2      Comment:      Effective December 21, 2021 eGFRcr in adults is calculated using the 2021 CKD-EPI creatinine equation which includes age and gender (Artis et al., NEJ, DOI: 10.1056/CKQJtc8481823)    Calcium 9.5 8.6 - 10.0 mg/dL   Lipid panel reflex to direct LDL Non-fasting   Result Value Ref Range    Cholesterol 190 <200 mg/dL    Triglycerides 44 <150 mg/dL    Direct Measure  >=40 mg/dL    LDL Cholesterol Calculated 74 <=100 mg/dL    Non HDL Cholesterol 83 <130 mg/dL    Narrative    Cholesterol  Desirable:  <200 mg/dL    Triglycerides  Normal:  Less than 150 mg/dL  Borderline High:  150-199 mg/dL  High:  200-499 mg/dL  Very High:  Greater than or equal to 500 mg/dL    Direct Measure HDL  Female:  Greater than or equal to 50 mg/dL   Male:  Greater than or equal to 40 mg/dL    LDL Cholesterol  Desirable:  <100mg/dL  Above Desirable:  100-129 mg/dL   Borderline High:  130-159 mg/dL   High:  160-189 mg/dL   Very High:  >= 190 mg/dL    Non HDL Cholesterol  Desirable:  130 mg/dL  Above Desirable:  130-159 mg/dL  Borderline High:  160-189 mg/dL  High:  190-219 mg/dL  Very High:  Greater than or equal to 220 mg/dL   HEMOGLOBIN A1C   Result Value Ref Range    Hemoglobin A1C 5.5 0.0 - 5.6 %       Comment:      Normal <5.7%   Prediabetes 5.7-6.4%    Diabetes 6.5% or higher     Note: Adopted from ADA consensus guidelines.       If you have any questions or concerns, please call the clinic at the number listed above.       Sincerely,      Peyman James MD

## 2022-09-08 NOTE — PROGRESS NOTES
"Social Work Note:    Data and Intervention: Met with patient during visit. Patient is currently homeless, states he stays on the street rather than at a shelter as he does not like shelters. Care teams shows that he had an ACT Team through [facility]/ [facility].     [facility]ACT team ([phone]) Patient signed OMER.   ACT team can meet in person with patient. Patient is hard to get ahold of as he does not have a phone number or address, but states \"oh they know where to find me.\"  Called Sammie, no answer, left VM requesting that she get in touch with patient, and that she give this SW a call back with update.     Called EBT card number to see food stamp balance   $50.43 cash assistance balance  Food stamp balance $0  Patient did not seem happy with this balance but did not elaborate regarding what he expected.     Provided patient with snacks, could not take box of canned goods home as he does not have a kitchen. States he goes to the local places that have free meals and usually gets food that way.     Assessment and Plan: Will wait to hear back from Sammie at [facility]. Ideally will get him reconnected with ACT Team and they can work to get him housed.     Melly Obregon, PAO   "

## 2022-09-08 NOTE — PROGRESS NOTES
Assessment & Plan     Type 2 diabetes mellitus without complication, without long-term current use of insulin (H)  Will assess control and adjust regimen accordingly  - Albumin Random Urine Quantitative with Creat Ratio; Future  - BASIC METABOLIC PANEL; Future  - Lipid panel reflex to direct LDL Non-fasting; Future  - HEMOGLOBIN A1C; Future  - metFORMIN (GLUCOPHAGE) 500 MG tablet; Take 1 tablet (500 mg) by mouth 2 times daily (with meals)    Benign essential hypertension  Borderline control, will continue to follow  - BASIC METABOLIC PANEL; Future  - lisinopril (ZESTRIL) 10 MG tablet; Take 1 tablet (10 mg) by mouth daily    Paranoid schizophrenia (H)  Seeing Psych regularly for this.  I'd like to get a release for them so we can update his medication list    Homelessness  Will have our  check in with Mr. Juarez to assist with housing            Depression Screening Follow Up    PHQ 9/8/2022   PHQ-9 Total Score 9   Q9: Thoughts of better off dead/self-harm past 2 weeks Several days   F/U: Thoughts of suicide or self-harm -   F/U: Self harm-plan -   F/U: Self-harm action -   F/U: Safety concerns -                 Follow Up  Pt is seeing his  clinic regularly  Follow Up Actions Taken  Crisis resource information provided in the After Visit Summary  Referred patient back to mental health provider    Discussed the following ways the patient can remain in a safe environment:  be around others      No follow-ups on file.    Peyman James MD  [facility]    Adryan Hernandez is a 51 year old, presenting for the following health issues:  Physical (CPE no concern today )      FUAD Hernandez is a longstanding patient of mine who has severe persistent mental illness and h/o polysubstance abuse.  He completed residential treatment early this year and reports that he has been homeless for the past several months.  He has managed to maintain sobriety and reports no drug use.  He  "is getting enough to eat and is getting his medications.  He requests that SW helps with housing options.    Review of Systems   Constitutional, HEENT, cardiovascular, pulmonary, gi and gu systems are negative, except as otherwise noted.      Objective    BP (!) 133/91   Pulse 100   Temp 98.6  F (37  C) (Oral)   Resp 16   Ht 1.713 m (5' 7.44\")   Wt 94.3 kg (208 lb)   SpO2 99%   BMI 32.15 kg/m    Body mass index is 32.15 kg/m .  Physical Exam   GENERAL: healthy, alert and no distress  EYES: Eyes grossly normal to inspection, PERRL and conjunctivae and sclerae normal  HENT: ear canals and TM's normal, nose and mouth without ulcers or lesions  NECK: no adenopathy, no asymmetry, masses, or scars and thyroid normal to palpation  RESP: lungs clear to auscultation - no rales, rhonchi or wheezes  CV: regular rate and rhythm, normal S1 S2, no S3 or S4, no murmur, click or rub, no peripheral edema and peripheral pulses strong  ABDOMEN: bowel sounds normal and well-healed incision along the midline  MS: no gross musculoskeletal defects noted, no edema  SKIN: no suspicious lesions or rashes  NEURO: Normal strength and tone, mentation intact and speech normal  PSYCH: inattentive and affect flat                    Answers for HPI/ROS submitted by the patient on 9/8/2022  If you checked off any problems, how difficult have these problems made it for you to do your work, take care of things at home, or get along with other people?: Somewhat difficult  PHQ9 TOTAL SCORE: 12      "

## 2022-09-08 NOTE — LETTER
September 9, 2022      David Juarez  [address]  [facility] [place] [address]            Dear David,   Your labwork returned with the following results, normal blood sugar, kidney function, and cholesterol.   I recommend the following: follow up with me in one month.   Take care,   Dr. James       Resulted Orders   BASIC METABOLIC PANEL   Result Value Ref Range    Creatinine 0.70 0.67 - 1.17 mg/dL    Sodium 139 136 - 145 mmol/L    Potassium 4.3 3.4 - 5.3 mmol/L    Urea Nitrogen 3.2 (L) 6.0 - 20.0 mg/dL    Chloride 103 98 - 107 mmol/L    Carbon Dioxide (CO2) 28 22 - 29 mmol/L    Anion Gap 8 7 - 15 mmol/L    Glucose 92 70 - 99 mg/dL    GFR Estimate >90 >60 mL/min/1.73m2      Comment:      Effective December 21, 2021 eGFRcr in adults is calculated using the 2021 CKD-EPI creatinine equation which includes age and gender (Artis et al., NEJ, DOI: 10.1056/UQMHgt8983548)    Calcium 9.5 8.6 - 10.0 mg/dL   Lipid panel reflex to direct LDL Non-fasting   Result Value Ref Range    Cholesterol 190 <200 mg/dL    Triglycerides 44 <150 mg/dL    Direct Measure  >=40 mg/dL    LDL Cholesterol Calculated 74 <=100 mg/dL    Non HDL Cholesterol 83 <130 mg/dL    Narrative    Cholesterol  Desirable:  <200 mg/dL    Triglycerides  Normal:  Less than 150 mg/dL  Borderline High:  150-199 mg/dL  High:  200-499 mg/dL  Very High:  Greater than or equal to 500 mg/dL    Direct Measure HDL  Female:  Greater than or equal to 50 mg/dL   Male:  Greater than or equal to 40 mg/dL    LDL Cholesterol  Desirable:  <100mg/dL  Above Desirable:  100-129 mg/dL   Borderline High:  130-159 mg/dL   High:  160-189 mg/dL   Very High:  >= 190 mg/dL    Non HDL Cholesterol  Desirable:  130 mg/dL  Above Desirable:  130-159 mg/dL  Borderline High:  160-189 mg/dL  High:  190-219 mg/dL  Very High:  Greater than or equal to 220 mg/dL   HEMOGLOBIN A1C   Result Value Ref Range    Hemoglobin A1C 5.5 0.0 - 5.6 %      Comment:      Normal <5.7%    Prediabetes 5.7-6.4%    Diabetes 6.5% or higher     Note: Adopted from ADA consensus guidelines.       If you have any questions or concerns, please call the clinic at the number listed above.       Sincerely,      Peyman James MD

## 2022-09-08 NOTE — PROGRESS NOTES
Answers for HPI/ROS submitted by the patient on 9/8/2022  If you checked off any problems, how difficult have these problems made it for you to do your work, take care of things at home, or get along with other people?: Somewhat difficult  PHQ9 TOTAL SCORE: 12  Frequency of exercise:: 2-3 days/week  Getting at least 3 servings of Calcium per day:: Yes  Diet:: Diabetic  Taking medications regularly:: Yes  Medication side effects:: No muscle aches  Bi-annual eye exam:: NO  Dental care twice a year:: NO  Sleep apnea or symptoms of sleep apnea:: None  abdominal pain: No  Blood in stool: No  Blood in urine: No  chest pain: No  chills: No  congestion: No  constipation: No  cough: No  Additional concerns today:: No  Duration of exercise:: 15-30 minutes    SUBJECTIVE:   CC: David Juarez is an 51 year old male who presents for preventative health visit.     {Split Bill scripting  The purpose of this visit is to discuss your medical history and prevent health problems before you are sick. You may be responsible for a co-pay, coinsurance, or deductible if your visit today includes services such as checking on a sore throat, having an x-ray or lab test, or treating and evaluating a new or existing condition :591501}  {Patient advised of split billing (Optional):345408}  HPI  {Add if <65 person on Medicare  - Required Questions (Optional):311795}  {Outside tests to abstract? :494801}    {additional problems to add (Optional):330342}    Today's PHQ-2 Score:   PHQ-2 ( 1999 Pfizer) 9/8/2022   Q1: Little interest or pleasure in doing things 2   Q2: Feeling down, depressed or hopeless 1   PHQ-2 Score 3   PHQ-2 Total Score (12-17 Years)- Positive if 3 or more points; Administer PHQ-A if positive -   Q1: Little interest or pleasure in doing things More than half the days   Q2: Feeling down, depressed or hopeless Several days   PHQ-2 Score 3       Abuse: Current or Past(Physical, Sexual or Emotional)- { :765232}  Do you feel safe  "in your environment? { :140723}    Have you ever done Advance Care Planning? (For example, a Health Directive, POLST, or a discussion with a medical provider or your loved ones about your wishes): { :535829}    Social History     Tobacco Use     Smoking status: Current Every Day Smoker     Packs/day: 0.10     Years: 5.00     Pack years: 0.50     Types: Cigarettes     Smokeless tobacco: Never Used     Tobacco comment: pt is not ready to quit at this time.   Substance Use Topics     Alcohol use: Yes     Comment: heavy daily drinker for years, last drink 6/25/19     {Rooming Staff- Complete this question if Prescreen response is not shown below for today's visit. If you drink alcohol do you typically have >3 drinks per day or >7 drinks per week? (Optional):043258}    Alcohol Use 9/8/2022   Prescreen: >3 drinks/day or >7 drinks/week? No   {add AUDIT responses (Optional) (A score of 7 for adult men is an indication of hazardous drinking; a score of 8 or more is an indication of an alcohol use disorder.  A score of 7 or more for adult women is an indication of hazardous drinking or an alchohol use disorder):294330}    Last PSA:   PSA   Date Value Ref Range Status   07/12/2019 0.47 0 - 4 ug/L Final     Comment:     Assay Method:  Chemiluminescence using Siemens Vista analyzer       Reviewed orders with patient. Reviewed health maintenance and updated orders accordingly - { :229537::\"Yes\"}  {Chronicprobdata (optional):935072}    Reviewed and updated as needed this visit by clinical staff   Tobacco  Allergies  Meds   Med Hx  Surg Hx  Fam Hx  Soc Hx          Reviewed and updated as needed this visit by Provider                   {HISTORY OPTIONS (Optional):555140}    Review of Systems  {MALE ROS (Optional):845971::\"CONSTITUTIONAL: NEGATIVE for fever, chills, change in weight\",\"INTEGUMENTARY/SKIN: NEGATIVE for worrisome rashes, moles or lesions\",\"EYES: NEGATIVE for vision changes or irritation\",\"ENT: NEGATIVE for ear, " "mouth and throat problems\",\"RESP: NEGATIVE for significant cough or SOB\",\"CV: NEGATIVE for chest pain, palpitations or peripheral edema\",\"GI: NEGATIVE for nausea, abdominal pain, heartburn, or change in bowel habits\",\" male: negative for dysuria, hematuria, decreased urinary stream, erectile dysfunction, urethral discharge\",\"MUSCULOSKELETAL: NEGATIVE for significant arthralgias or myalgia\",\"NEURO: NEGATIVE for weakness, dizziness or paresthesias\",\"PSYCHIATRIC: NEGATIVE for changes in mood or affect\"}    OBJECTIVE:   BP (!) 133/91   Pulse 100   Temp 98.6  F (37  C) (Oral)   Resp 16   Ht 1.713 m (5' 7.44\")   Wt 94.3 kg (208 lb)   SpO2 99%   BMI 32.15 kg/m      Physical Exam  {Exam Choices (Optional):552651}    {Diagnostic Test Results (Optional):861115::\"Diagnostic Test Results:\",\"Labs reviewed in Epic\"}    ASSESSMENT/PLAN:   {Diag Picklist:245733}    {Patient advised of split billing (Optional):870568}    COUNSELING:   {MALE COUNSELING MESSAGES:297864::\"Reviewed preventive health counseling, as reflected in patient instructions\"}    Estimated body mass index is 32.15 kg/m  as calculated from the following:    Height as of this encounter: 1.713 m (5' 7.44\").    Weight as of this encounter: 94.3 kg (208 lb).     {Weight Management Plan (ACO) Complete if BMI is abnormal-  Ages 18-64  BMI >24.9.  Age 65+ with BMI <23 or >30 (Optional):945828}    He reports that he has been smoking cigarettes. He has a 0.50 pack-year smoking history. He has never used smokeless tobacco.  Nicotine/Tobacco Cessation Plan:   {Nicotine/Tobacco Cessation Plan:359646}      Counseling Resources:  ATP IV Guidelines  Pooled Cohorts Equation Calculator  FRAX Risk Assessment  ICSI Preventive Guidelines  Dietary Guidelines for Americans, 2010  USDA's MyPlate  ASA Prophylaxis  Lung CA Screening    Peyman James MD  [facility]  "

## 2022-10-03 NOTE — TELEPHONE ENCOUNTER
[facility] phone call message- general phone call:    Reason for call: the Community psyche nurse called to ask some questions about treatment and would silver a call back      Return call needed: Yes    OK to leave a message on voice mail? Yes    Primary language: English      needed? No    Call taken on October 3, 2022 at 10:07 AM by Jim Chavez

## 2023-01-01 ENCOUNTER — TELEPHONE (OUTPATIENT)
Dept: FAMILY MEDICINE | Facility: CLINIC | Age: 53
End: 2023-01-01

## 2023-01-01 ENCOUNTER — OFFICE VISIT (OUTPATIENT)
Dept: FAMILY MEDICINE | Facility: CLINIC | Age: 53
End: 2023-01-01
Payer: MEDICAID

## 2023-01-01 ENCOUNTER — TELEPHONE (OUTPATIENT)
Dept: FAMILY MEDICINE | Facility: CLINIC | Age: 53
End: 2023-01-01
Payer: MEDICAID

## 2023-01-01 VITALS
SYSTOLIC BLOOD PRESSURE: 115 MMHG | OXYGEN SATURATION: 97 % | HEART RATE: 98 BPM | RESPIRATION RATE: 20 BRPM | DIASTOLIC BLOOD PRESSURE: 81 MMHG | WEIGHT: 203.4 LBS | BODY MASS INDEX: 31.44 KG/M2 | TEMPERATURE: 98.9 F

## 2023-01-01 DIAGNOSIS — I10 BENIGN ESSENTIAL HYPERTENSION: ICD-10-CM

## 2023-01-01 DIAGNOSIS — G63 POLYNEUROPATHY ASSOCIATED WITH UNDERLYING DISEASE (H): Primary | ICD-10-CM

## 2023-01-01 DIAGNOSIS — M25.562 CHRONIC PAIN OF LEFT KNEE: Primary | ICD-10-CM

## 2023-01-01 DIAGNOSIS — Z59.00 HOMELESSNESS: ICD-10-CM

## 2023-01-01 DIAGNOSIS — G89.29 CHRONIC PAIN OF LEFT KNEE: Primary | ICD-10-CM

## 2023-01-01 DIAGNOSIS — E11.9 TYPE 2 DIABETES MELLITUS WITHOUT COMPLICATION, WITHOUT LONG-TERM CURRENT USE OF INSULIN (H): ICD-10-CM

## 2023-01-01 PROCEDURE — 99213 OFFICE O/P EST LOW 20 MIN: CPT | Mod: GC

## 2023-01-01 RX ORDER — LISINOPRIL 10 MG/1
10 TABLET ORAL DAILY
Qty: 30 TABLET | Refills: 1 | Status: SHIPPED | OUTPATIENT
Start: 2023-01-01 | End: 2023-01-01

## 2023-01-01 RX ORDER — LISINOPRIL 10 MG/1
10 TABLET ORAL DAILY
Qty: 30 TABLET | Refills: 1 | Status: SHIPPED | OUTPATIENT
Start: 2023-01-01

## 2023-01-01 RX ORDER — GABAPENTIN 300 MG/1
300 CAPSULE ORAL 3 TIMES DAILY
Qty: 90 CAPSULE | Refills: 11 | Status: SHIPPED | OUTPATIENT
Start: 2023-01-01

## 2023-01-01 SDOH — ECONOMIC STABILITY - HOUSING INSECURITY: HOMELESSNESS UNSPECIFIED: Z59.00

## 2023-04-27 NOTE — TELEPHONE ENCOUNTER
[facility] phone call message- general phone call:    Reason for call: She needs a call back re if she can start this patient on a med he has been awall for some time now.    Action desired: call back.    Return call needed: Yes    OK to leave a message on voice mail? Yes    Advised patient to response may take up to 2 business days: Yes    Primary language: English      needed? No    Call taken on April 27, 2023 at 3:55 PM by Guerrero Daley

## 2023-04-28 NOTE — TELEPHONE ENCOUNTER
Routing to Dr. James per community nurse to refill pt's gabapentin and Dr. Fong as FYI.    She said he was on 300 mg TID in chemical dependency treatment by a psychiatrist at the end of last yr. He was then on a bridge by their psychiatrist in Jan or Feb. Pt has been off gabapentin since then. She reports he has been mostly complaining of R knee pain and would like to get some gabapentin for him.    She has been seeing him monthly as he is now homeless and doesn't always know where he is. She only sees him when he is in detox which he will be in today. The nurses there will call to let her know. He has no phone so no way to reach him. He has a  with her agency that is helping him find housing.    Scheduled him to see Dr. Fong next week since he has not been seen for a while. She will try to bring him but will call if unable to. She thinks he will still be in detox by then.    Antonette Hernandez RN on 4/28/2023 at 9:44 AM

## 2023-05-02 NOTE — PROGRESS NOTES
"  Assessment & Plan     Chronic pain of left knee  -Discussed with patient that given gabapentin has been effective for him in the past I recommend that we continue this medication at this time.  Patient has not yet picked up his refill of this medication and I encouraged him to attempt to obtain this medication as it was effective in the past.  In addition, I connected him with our in-house  following the conclusion of our visit.  Furthermore, I discussed with the patient that if his left knee pain does not improve with gabapentin that he should return for follow-up visit so we can further investigate other treatment modalities.  He verbalized clear understanding and agreement to treatment plan and had no other questions or concerns at this time    Homelessness  -Discussed with the patient that we can connect him with our in-house  who may be able to provide additional support and resources with the aim of obtaining steady housing.  -Our  was able to meet with the patient in the room following his visit today and discussed resources available to him and we will follow-up on helping him obtain housing.  -Patient verbalized clear understanding and agreement to treatment plan and had no further questions or concerns at this time    Type 2 diabetes mellitus without complication, without long-term current use of insulin (H)  -Discussed with patient that he is due for diabetes labs including A1c and micro-albumin. I recommended that we obtain labs today at this visit.  Patient declined labs today and stated that he would return at his earliest convenience for a follow-up appointment to address not only his chronic diabetes, but his other chronic conditions as well including hypertension.        BMI:   Estimated body mass index is 31.44 kg/m  as calculated from the following:    Height as of 9/8/22: 1.713 m (5' 7.44\").    Weight as of this encounter: 92.3 kg (203 lb 6.4 oz). " "    Return in about 1 month (around 6/2/2023) for Routine preventive.    DO MAXI Patton [facility]    Adryan Hernandez is a 52 year old, presenting for the following health issues:  OTHER (F/U ER for his knee )        5/2/2023     9:11 AM   Additional Questions   Roomed by denise   Accompanied by self     HPI   David presents to our clinic today with the primary concern of left knee pain.  He states that he has had chronic left knee pain for at least a year and states that gabapentin has been helpful in the past.  He states that Dr. Peyman James was able to send in a refill for his gabapentin earlier in the week but that he has not been able to pick this medication up as of yet.  He plans to  his gabapentin in the near future and will utilize a friend to help him pick this medication up.  The pain in his left knee is worse with regular ambulation and is improved with rest.  He denies any pain in his right knee.  He affirms swelling in the left knee.  Denies instability of the left knee joint.  Pain is localized to the knee and does not radiate down the leg.  He denies fever, chills, nausea, vomiting, shortness of breath, chest pain, abdominal pain, changes in bowel or bladder patterns.  He states that he has been sober from alcohol since his last hospitalization 3/12/2023.  He denies any other recreational drug use at this time.  He states that he is currently experiencing homelessness and has been \"couch surfing\" and staying at friends homes.  He states he is interested in receiving additional  with the ultimate aim of having stable housing in the future.  Reports that he has been compliant with his diabetes and antihypertensive medications.  He reports no additional concerns at this time      Review of Systems   Constitutional, HEENT, cardiovascular, pulmonary, gi and gu systems are negative, except as otherwise noted.  ROS reviewed, see HPI for pertinent " positives and negatives.  Elder Fong DO        Objective    /81 (BP Location: Right arm, Patient Position: Sitting, Cuff Size: Adult Regular)   Pulse 98   Temp 98.9  F (37.2  C) (Oral)   Resp 20   Wt 92.3 kg (203 lb 6.4 oz)   SpO2 97%   BMI 31.44 kg/m    Body mass index is 31.44 kg/m .  Physical Exam   GENERAL: healthy, alert and no distress  EYES: Eyes grossly normal to inspection, pulls equal and round bilaterally and conjunctivae and sclerae normal  HENT: External ears normal to inspection bilaterally  RESP: lungs clear to auscultation - no rales, rhonchi or wheezes  CV: regular rate and rhythm, normal S1 S2, no murmur, click or rub, no peripheral edema  ABDOMEN: soft, nontender, no hepatosplenomegaly  MS: Left knee appears mildly swollen when compared to the right.  Full flexion and extension of the right and left knee joint, no laxity of left or right knee joints, no pain elicited to palpation of knees bilaterally, negative bilateral valgus and varus testing of the knee joint  SKIN: no suspicious lesions or rashes exposed skin  NEURO: Normal strength and tone  PSYCH: slightly restricted affect, but answering questions appropriately    ----- Service Performed and Documented by Resident or Fellow ------   Precepted with Dr. Jacob Fong DO

## 2023-05-02 NOTE — TELEPHONE ENCOUNTER
Face to face meeting with patient per request of provider Dr. Fong for housing instability.    Patient provided the following information:   - He has been homeless for approximately one year. Patient informed he moves from placed to place. No stability.   - Patient does receive social security disability for mental health diagnosis.    - Patient informed he has active case management services and his 's name is Ghanshyam: [phone].    During this initial conversation, patient said he receives housing updates along with phone numbers from his  and it is patient's responsibility to follow up on with those housing resources.    Patient informed he does not have an active/good phone number to reach him and asked he be contacted via Ghanshyam.    This  then met with client at the scheduling for  area to determine next in-office visit for follow up and care coordination.  During the scheduling process, it was noted an individual was escorting the patient.   The individual informed she is a FACT  with the [facility] and has been working towards self-sufficiency including housing.   Patient is currently serviced via a CAD[employer] waiver as well.  FACT contact is Larisa and she reported her supervisor is Ghanshyam.  Larisa: [phone]    Larisa stated the patient has had limited contact with the FACT program, however main program goal is [facility] housing.   Patient will need to follow FACT program requirements to move forward with the [facility] housing placement and this would include active participation.  Patient stated he is aware.  FACT is meeting the needs of the patient with housing search and case management responsibilities.    Next in-office appointment was scheduled for June 5, 2023@9:20am with Dr. James.   This  will be available to care coordinate further if FACT would like to do so.    COLLEEN Gutierrez -  / Care Coordinator  [facility]: Chidi Asher  [facility], [address]

## 2023-05-02 NOTE — PATIENT INSTRUCTIONS
-Thank you for your visit today  -As we discussed, the gabapentin that was sent into your pharmacy may be helpful for your knee pain  -If your knee pain does not improve or worsens, please return for an additional visit.   Elder Fong, DO

## 2023-05-02 NOTE — PROGRESS NOTES
Preceptor Attestation:    I discussed the patient with the resident and evaluated the patient in person. I have verified the content of the note, which accurately reflects my assessment of the patient and the plan of care.   Supervising Physician:  Peyman James MD.

## 2023-07-31 NOTE — TELEPHONE ENCOUNTER
[facility] phone call message- general phone call:    Reason for call: patient passed fyi for Jacob Mustafa    Action desired: call back if questions     Return call needed: Yes    OK to leave a message on voice mail? Yes    Advised patient to response may take up to 2 business days: Yes    Primary language: English      needed? No    Call taken on July 31, 2023 at 5:01 PM by Guerrero Daley

## 2024-11-06 ASSESSMENT — PATIENT HEALTH QUESTIONNAIRE - PHQ9: SUM OF ALL RESPONSES TO PHQ QUESTIONS 1-9: 12
